# Patient Record
Sex: FEMALE | Race: WHITE | NOT HISPANIC OR LATINO | Employment: FULL TIME | URBAN - METROPOLITAN AREA
[De-identification: names, ages, dates, MRNs, and addresses within clinical notes are randomized per-mention and may not be internally consistent; named-entity substitution may affect disease eponyms.]

---

## 2017-08-19 ENCOUNTER — HOSPITAL ENCOUNTER (EMERGENCY)
Facility: HOSPITAL | Age: 27
Discharge: HOME | End: 2017-08-19
Attending: EMERGENCY MEDICINE
Payer: COMMERCIAL

## 2017-08-19 VITALS
RESPIRATION RATE: 20 BRPM | HEART RATE: 87 BPM | SYSTOLIC BLOOD PRESSURE: 113 MMHG | WEIGHT: 145 LBS | OXYGEN SATURATION: 99 % | TEMPERATURE: 98 F | DIASTOLIC BLOOD PRESSURE: 63 MMHG

## 2017-08-19 DIAGNOSIS — R51.9 HEADACHE: ICD-10-CM

## 2017-08-19 DIAGNOSIS — S09.90XA CLOSED HEAD INJURY, INITIAL ENCOUNTER: ICD-10-CM
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
## 2017-08-19 DIAGNOSIS — S16.1XXA NECK MUSCLE STRAIN, INITIAL ENCOUNTER: Primary | ICD-10-CM

## 2017-08-19 PROCEDURE — 99283 EMERGENCY DEPT VISIT LOW MDM: CPT

## 2017-08-19 RX ORDER — IBUPROFEN 600 MG/1
600 TABLET ORAL ONCE
Status: COMPLETED | OUTPATIENT
Start: 2017-08-19 | End: 2017-08-19

## 2017-08-19 RX ORDER — ACETAMINOPHEN 325 MG/1
650 TABLET ORAL ONCE
Status: COMPLETED | OUTPATIENT
Start: 2017-08-19 | End: 2017-08-19

## 2017-08-19 RX ADMIN — ACETAMINOPHEN 650 MG: 325 TABLET, FILM COATED ORAL at 07:03

## 2017-08-19 RX ADMIN — IBUPROFEN 600 MG: 600 TABLET, FILM COATED ORAL at 07:03

## 2017-11-29 ENCOUNTER — APPOINTMENT (OUTPATIENT)
Dept: URGENT CARE | Facility: CLINIC | Age: 27
End: 2017-11-29
Payer: OTHER MISCELLANEOUS

## 2017-11-29 PROCEDURE — 99283 EMERGENCY DEPT VISIT LOW MDM: CPT

## 2017-11-29 PROCEDURE — G0382 LEV 3 HOSP TYPE B ED VISIT: HCPCS

## 2017-12-05 ENCOUNTER — APPOINTMENT (OUTPATIENT)
Dept: URGENT CARE | Facility: CLINIC | Age: 27
End: 2017-12-05
Payer: OTHER MISCELLANEOUS

## 2017-12-05 PROCEDURE — 99213 OFFICE O/P EST LOW 20 MIN: CPT

## 2018-12-30 ENCOUNTER — OFFICE VISIT (OUTPATIENT)
Dept: URGENT CARE | Facility: CLINIC | Age: 28
End: 2018-12-30
Payer: COMMERCIAL

## 2018-12-30 ENCOUNTER — APPOINTMENT (OUTPATIENT)
Dept: RADIOLOGY | Facility: CLINIC | Age: 28
End: 2018-12-30
Payer: COMMERCIAL

## 2018-12-30 VITALS
BODY MASS INDEX: 22.07 KG/M2 | HEART RATE: 68 BPM | TEMPERATURE: 98.5 F | WEIGHT: 149 LBS | SYSTOLIC BLOOD PRESSURE: 112 MMHG | OXYGEN SATURATION: 97 % | HEIGHT: 69 IN | RESPIRATION RATE: 16 BRPM | DIASTOLIC BLOOD PRESSURE: 74 MMHG

## 2018-12-30 DIAGNOSIS — S62.336A CLOSED DISPLACED FRACTURE OF NECK OF FIFTH METACARPAL BONE OF RIGHT HAND, INITIAL ENCOUNTER: Primary | ICD-10-CM

## 2018-12-30 DIAGNOSIS — M79.641 HAND PAIN, RIGHT: ICD-10-CM

## 2018-12-30 PROCEDURE — 73130 X-RAY EXAM OF HAND: CPT

## 2018-12-30 PROCEDURE — 29125 APPL SHORT ARM SPLINT STATIC: CPT | Performed by: PHYSICIAN ASSISTANT

## 2018-12-30 PROCEDURE — G0382 LEV 3 HOSP TYPE B ED VISIT: HCPCS | Performed by: PHYSICIAN ASSISTANT

## 2018-12-30 NOTE — PATIENT INSTRUCTIONS
Ibuprofen for pain and swelling  Ice to the area  Elevate  Were splint until seen by ortho  Call Ortho tomorrow morning at opening for an appointment  If anything worsens or changes go to the ER

## 2018-12-30 NOTE — PROGRESS NOTES
NAME: Curtis Guerrero is a 33 y o  female  : 1990    MRN: [id number]      Assessment and Plan   Closed displaced fracture of neck of fifth metacarpal bone of right hand, initial encounter [C07 336A]  1  Closed displaced fracture of neck of fifth metacarpal bone of right hand, initial encounter  Ambulatory referral to Orthopedic Surgery   2  Hand pain, right  XR hand 3+ vw right     Ulnar gutter ortho glass splint applied in clinic  Patient neurovascularly intact postprocedure  Right hand x-ray:  Fracture of the distal 5th metacarpal   Spoke with Radiology on the phone-they report a 45 degree the volar angulation  Spoke with CUATE Gonzalez at [facility] ER- she instruct to splint patient in with some traction but referred to Ortho tomorrow  She reports no need for immediate reduction  Patient Instructions   Patient Instructions   Ibuprofen for pain and swelling  Ice to the area  Elevate  Were splint until seen by ortho  Call Ortho tomorrow morning at opening for an appointment  If anything worsens or changes go to the ER    Proceed to ER if symptoms worsen  History of Present Illness     Patient presents complaining of right hand pain x1 day  She reports she woke up this morning and noticed that her right hand was swollen and painful  She reports she was drinking last night and does not remember what happened but states her hand is not like that prior  She reports mild pain at rest but more severe pain with movement especially of the MCP joints  Denies any numbness or tingling to the finger tips  She reports fiber 6 years ago she had a 5th metacarpal fracture from punching a wall  She reports that was pinned she has had no problems since  Review of Systems   Review of Systems   Musculoskeletal:        Right hand pain and swelling         Current Medications     No current outpatient prescriptions on file      Current Allergies     Allergies as of 2018 - Reviewed 2018 Allergen Reaction Noted    Penicillins  08/19/2017              No past medical history on file  No past surgical history on file  No family history on file  Medications have been verified  The following portions of the patient's history were reviewed and updated as appropriate: allergies, current medications, past family history, past medical history, past social history, past surgical history and problem list     Objective   /74   Pulse 68   Temp 98 5 °F (36 9 °C)   Resp 16   Ht 5' 9" (1 753 m)   Wt 67 6 kg (149 lb)   SpO2 97%   BMI 22 00 kg/m²      Physical Exam     Physical Exam   Constitutional: She appears well-developed and well-nourished  No distress  Musculoskeletal:   Right hand:  Area of edema and ecchymosis overlying the dorsal aspect of the 4th and 5th MCP joints  Tender to palpation over this area  Flexion of MCP joints to 90° but with pain  Full range of motion of distal fingers without much pain  Capillary refill less than 2 seconds distal   Sensation intact distally

## 2018-12-30 NOTE — LETTER
December 30, 2018     Patient: Bruce Aldana   YOB: 1990   Date of Visit: 12/30/2018       To Whom it May Concern:    Bruce Aldana was seen in my clinic on 12/30/2018  If you have any questions or concerns, please don't hesitate to call           Sincerely,          KAREN Beaulieu        CC: No Recipients

## 2019-01-02 ENCOUNTER — OFFICE VISIT (OUTPATIENT)
Dept: OBGYN CLINIC | Facility: CLINIC | Age: 29
End: 2019-01-02
Payer: COMMERCIAL

## 2019-01-02 VITALS
DIASTOLIC BLOOD PRESSURE: 68 MMHG | WEIGHT: 148 LBS | BODY MASS INDEX: 21.92 KG/M2 | HEIGHT: 69 IN | HEART RATE: 72 BPM | SYSTOLIC BLOOD PRESSURE: 110 MMHG

## 2019-01-02 DIAGNOSIS — S62.366A NONDISPLACED FRACTURE OF NECK OF FIFTH METACARPAL BONE, RIGHT HAND, INITIAL ENCOUNTER FOR CLOSED FRACTURE: ICD-10-CM

## 2019-01-02 PROCEDURE — 29125 APPL SHORT ARM SPLINT STATIC: CPT | Performed by: PHYSICIAN ASSISTANT

## 2019-01-02 PROCEDURE — 99203 OFFICE O/P NEW LOW 30 MIN: CPT | Performed by: PHYSICIAN ASSISTANT

## 2019-01-02 NOTE — LETTER
January 2, 2019     Patient: Mic Lauren   YOB: 1990   Date of Visit: 1/2/2019       To Whom it May Concern:    Mic Lauren is under my professional care  She was seen in my office on 1/2/2019  She  may do sedentary duty at work  No lifting greater than 5 lb  Must wear splint to right hand at work  She will be re-evaluated in 4-6 weeks  If you have any questions or concerns, please don't hesitate to call           Sincerely,          Yong Ramires MD        CC: No Recipients

## 2019-01-02 NOTE — PROGRESS NOTES
Assessment:     1  Nondisplaced fracture of neck of fifth metacarpal bone, right hand, initial encounter for closed fracture        Plan:  The patient was seen and examined by Dr Ac Gallo and myself  Problem List Items Addressed This Visit        Musculoskeletal and Integument    Nondisplaced fracture of neck of fifth metacarpal bone, right hand, initial encounter for closed fracture     Findings consistent with right nondisplaced 5th metacarpal neck fracture  Findings and treatment options were discussed the patient  X-rays reviewed with her  Recommend custom molded ulnar gutter splint to be made by the occupational therapist  She may remove it for bathing and occasionally for gentle range of motion  The occupational therapist was not available today since she was placed in a new ulnar gutter splint that she will wear until she is able to be seen by the occupational therapist   Continue nonweightbearing to the right upper extremity  She was given a work note  Follow-up in 4-6 weeks with repeat x-rays  All questions were answered to patient's satisfaction  Relevant Orders    Ambulatory referral to Occupational Therapy    Orthopedic injury treatment (Completed)         Subjective:     Patient ID: Alena Tesfaye is a 33 y o  female  Chief Complaint: This is a right-hand-dominant 65-year-old white female who suffered injury to her right hand on December 29, 2018  Patient does not remember how she injured her hand since she was intoxicated  She remembers waking up with increased swelling and pain in that hand  She was seen at urgent care and x-rays revealed a fracture at the neck of the 5th metacarpal   She was placed in ulnar gutter splint  Her pain is well controlled  She states she had a prior fracture to her 5th metacarpal about 5-6 years ago that was treated with a percutaneous pin with no complications  The pin was later removed  Patient intake form was reviewed today  Allergy:  Allergies   Allergen Reactions    Penicillins      Medications:  all current active meds have been reviewed  Past Medical History:  History reviewed  No pertinent past medical history  Past Surgical History:  History reviewed  No pertinent surgical history  Family History:  Family History   Problem Relation Age of Onset    Diabetes Father      Social History:  History   Alcohol Use    Yes     History   Drug Use No     History   Smoking Status    Never Smoker   Smokeless Tobacco    Never Used     Review of Systems   Constitutional: Negative  HENT: Negative  Eyes: Negative  Respiratory: Negative  Cardiovascular: Negative  Gastrointestinal: Negative  Endocrine: Negative  Genitourinary: Negative  Musculoskeletal: Positive for arthralgias and joint swelling  Skin: Negative  Allergic/Immunologic: Negative  Neurological: Negative  Hematological: Negative  Psychiatric/Behavioral: Negative  Objective:  BP Readings from Last 1 Encounters:   01/02/19 110/68      Wt Readings from Last 1 Encounters:   01/02/19 67 1 kg (148 lb)      BMI:   Estimated body mass index is 21 86 kg/m² as calculated from the following:    Height as of this encounter: 5' 9" (1 753 m)  Weight as of this encounter: 67 1 kg (148 lb)  BSA:   Estimated body surface area is 1 82 meters squared as calculated from the following:    Height as of this encounter: 5' 9" (1 753 m)  Weight as of this encounter: 67 1 kg (148 lb)  Physical Exam   Constitutional: She is oriented to person, place, and time  She appears well-developed  HENT:   Head: Normocephalic and atraumatic  Eyes: Conjunctivae and EOM are normal    Neck: Neck supple  Neurological: She is alert and oriented to person, place, and time  Skin: Skin is warm  Psychiatric: She has a normal mood and affect  Nursing note and vitals reviewed      Right Hand Exam     Tenderness   Right hand tenderness location: Fifth metacarpal neck  Other   Erythema: absent  Scars: present (Over distal aspect of 5th metacarpal)  Sensation: normal  Pulse: present    Comments:  Swelling and ecchymosis over 5th metacarpal  No rotational deformity of small finger  Capillary refill brisk            I have personally reviewed pertinent films in PACS    X-rays of the right hand reveal a nondisplaced fracture at the neck of the 5th metacarpal     Fracture / Dislocation Treatment  Date/Time: 1/2/2019 5:34 PM  Performed by: Minnie Moss  Authorized by: Yosef Gonzales     Patient Location:  Clinic  Injury location:  Hand  Location details:  Right hand  Injury type:  Fracture  Neurovascular status: Neurovascularly intact    Distal perfusion: normal    Neurological function: normal    Range of motion: reduced    Manipulation performed?: No    Immobilization:  Splint  Splint type:  Ulnar gutter  Supplies used:  Cotton padding and plaster  Neurovascular status: Neurovascularly intact    Distal perfusion: normal    Neurological function: normal    Range of motion: unchanged    Patient tolerance:  Patient tolerated the procedure well with no immediate complications

## 2019-01-02 NOTE — ASSESSMENT & PLAN NOTE
Findings consistent with right nondisplaced 5th metacarpal neck fracture  Findings and treatment options were discussed the patient  X-rays reviewed with her  Recommend custom molded ulnar gutter splint to be made by the occupational therapist  She may remove it for bathing and occasionally for gentle range of motion  The occupational therapist was not available today since she was placed in a new ulnar gutter splint that she will wear until she is able to be seen by the occupational therapist   Continue nonweightbearing to the right upper extremity  She was given a work note  Follow-up in 4-6 weeks with repeat x-rays  All questions were answered to patient's satisfaction

## 2019-01-03 ENCOUNTER — EVALUATION (OUTPATIENT)
Dept: OCCUPATIONAL THERAPY | Facility: CLINIC | Age: 29
End: 2019-01-03
Payer: COMMERCIAL

## 2019-01-03 DIAGNOSIS — S62.245A: Primary | ICD-10-CM

## 2019-01-03 PROCEDURE — 97760 ORTHOTIC MGMT&TRAING 1ST ENC: CPT | Performed by: OCCUPATIONAL THERAPIST

## 2019-01-03 NOTE — PROGRESS NOTES
Splint     Diagnosis:   1  Nondisplaced fracture of shaft of first metacarpal bone, left hand, initial encounter for closed fracture       Indication: Fracture    Location: Right  ring finger and small finger  Wrist   Supplies: Custom Fit Orthotic and Skin coverage   Splint type: Ulnar Gutter Hand-Forearm based  Wearing Schedule: Remove for hygiene only  Describe Position: intrinsic plus    Precautions: Universal (skin contact/breakdown)    Patient or Caregiver expresses understanding of wearing Schedule and Precautions? Yes  Patient or Caregiver able to don/doff orthotic independently? Yes    Written orders provided to patient?  Yes  Orders Obtained: Written  Orders Obtained from: dr Banda Fearing      Return for evaluation and treatment No

## 2019-02-06 ENCOUNTER — OFFICE VISIT (OUTPATIENT)
Dept: OBGYN CLINIC | Facility: CLINIC | Age: 29
End: 2019-02-06
Payer: COMMERCIAL

## 2019-02-06 ENCOUNTER — APPOINTMENT (OUTPATIENT)
Dept: RADIOLOGY | Facility: CLINIC | Age: 29
End: 2019-02-06
Payer: COMMERCIAL

## 2019-02-06 VITALS
WEIGHT: 142 LBS | HEIGHT: 68 IN | DIASTOLIC BLOOD PRESSURE: 68 MMHG | SYSTOLIC BLOOD PRESSURE: 110 MMHG | BODY MASS INDEX: 21.52 KG/M2 | HEART RATE: 72 BPM

## 2019-02-06 DIAGNOSIS — S62.366A NONDISPLACED FRACTURE OF NECK OF FIFTH METACARPAL BONE, RIGHT HAND, INITIAL ENCOUNTER FOR CLOSED FRACTURE: ICD-10-CM

## 2019-02-06 DIAGNOSIS — S62.366D CLOSED NONDISPLACED FRACTURE OF NECK OF FIFTH METACARPAL BONE OF RIGHT HAND WITH ROUTINE HEALING, SUBSEQUENT ENCOUNTER: Primary | ICD-10-CM

## 2019-02-06 PROCEDURE — 99213 OFFICE O/P EST LOW 20 MIN: CPT | Performed by: ORTHOPAEDIC SURGERY

## 2019-02-06 PROCEDURE — 73130 X-RAY EXAM OF HAND: CPT

## 2019-02-06 NOTE — LETTER
February 6, 2019     Patient: Terri Rock   YOB: 1990   Date of Visit: 2/6/2019       To Whom it May Concern:    Terri Rock is under my professional care  She was seen in my office on 2/6/2019  She may return to work on 2/15/19  If you have any questions or concerns, please don't hesitate to call           Sincerely,          Maddison Sanabria MD        CC: No Recipients

## 2019-02-06 NOTE — PROGRESS NOTES
Assessment:     1  Closed nondisplaced fracture of neck of fifth metacarpal bone of right hand with routine healing, subsequent encounter        Plan:     Problem List Items Addressed This Visit        Musculoskeletal and Integument    Closed nondisplaced fracture of neck of fifth metacarpal bone of right hand - Primary     Findings consistent with right 5th metacarpal neck nondisplaced fracture, healing  Discussed findings and treatment options with the patient  Patient may wean of the ulnar gutter splint  I instructed home exercises to regain motion and strength  I provided patient a work note for her to return to work on 2/15/2019  Patient understands that she may still has some weakness with her right hand by that time  I advised patient to contact me if her symptoms does not resolve in a few weeks  All patient's questions were answered to her satisfaction  This note is created using dictation transcription  It may contain typographical errors, grammatical errors, improperly dictated words, background noise and other errors  Relevant Orders    XR hand 3+ vw right         Subjective:     Patient ID: Florencia Rodarte is a 33 y o  female  Chief Complaint:  67-year-old female follow-up right 5th metacarpal neck fracture  Patient has been wearing the ulnar gutter splint made by occupational therapist   She does complaining of some discomfort around the wrist by the 5th metacarpal   She has some limitation with little finger bending and mild pain over the little finger  Allergy:  Allergies   Allergen Reactions    Penicillins      Medications:  all current active meds have been reviewed  Past Medical History:  History reviewed  No pertinent past medical history  Past Surgical History:  History reviewed  No pertinent surgical history    Family History:  Family History   Problem Relation Age of Onset    Diabetes Father      Social History:  History   Alcohol Use    Yes     History   Drug Use No     History   Smoking Status    Never Smoker   Smokeless Tobacco    Never Used     Review of Systems   Constitutional: Negative  HENT: Negative  Eyes: Negative  Respiratory: Negative  Cardiovascular: Negative  Gastrointestinal: Negative  Endocrine: Negative  Genitourinary: Negative  Musculoskeletal: Negative for arthralgias and joint swelling  Skin: Negative  Allergic/Immunologic: Negative  Neurological: Negative  Hematological: Negative  Psychiatric/Behavioral: Negative  Objective:  BP Readings from Last 1 Encounters:   02/06/19 110/68      Wt Readings from Last 1 Encounters:   02/06/19 64 4 kg (142 lb)      BMI:   Estimated body mass index is 21 59 kg/m² as calculated from the following:    Height as of this encounter: 5' 8" (1 727 m)  Weight as of this encounter: 64 4 kg (142 lb)  BSA:   Estimated body surface area is 1 77 meters squared as calculated from the following:    Height as of this encounter: 5' 8" (1 727 m)  Weight as of this encounter: 64 4 kg (142 lb)  Physical Exam   Constitutional: She is oriented to person, place, and time  She appears well-developed  HENT:   Head: Normocephalic and atraumatic  Eyes: Conjunctivae and EOM are normal    Neck: Neck supple  Neurological: She is alert and oriented to person, place, and time  Skin: Skin is warm  Psychiatric: She has a normal mood and affect  Nursing note and vitals reviewed  Right Hand Exam     Tenderness   The patient is experiencing no tenderness  Range of Motion     Hand   MP Little: 70   PIP Little: normal   DIP Little: normal     Muscle Strength   : 4/5     Other   Erythema: absent  Sensation: normal  Pulse: present            I have personally reviewed pertinent films in PACS and my interpretation is Right hand show 5th metacarpal neck fracture in good alignment and healing

## 2019-02-06 NOTE — ASSESSMENT & PLAN NOTE
Findings consistent with right 5th metacarpal neck nondisplaced fracture, healing  Discussed findings and treatment options with the patient  Patient may wean of the ulnar gutter splint  I instructed home exercises to regain motion and strength  I provided patient a work note for her to return to work on 2/15/2019  Patient understands that she may still has some weakness with her right hand by that time  I advised patient to contact me if her symptoms does not resolve in a few weeks  All patient's questions were answered to her satisfaction  This note is created using dictation transcription  It may contain typographical errors, grammatical errors, improperly dictated words, background noise and other errors

## 2019-06-14 ENCOUNTER — APPOINTMENT (OUTPATIENT)
Dept: RADIOLOGY | Facility: CLINIC | Age: 29
End: 2019-06-14
Payer: COMMERCIAL

## 2019-06-14 ENCOUNTER — TRANSCRIBE ORDERS (OUTPATIENT)
Dept: RADIOLOGY | Facility: CLINIC | Age: 29
End: 2019-06-14

## 2019-06-14 DIAGNOSIS — R52 PAIN: ICD-10-CM

## 2019-06-14 DIAGNOSIS — R52 PAIN: Primary | ICD-10-CM

## 2019-06-14 PROCEDURE — 73030 X-RAY EXAM OF SHOULDER: CPT

## 2019-06-14 PROCEDURE — 72040 X-RAY EXAM NECK SPINE 2-3 VW: CPT

## 2019-07-07 ENCOUNTER — APPOINTMENT (OUTPATIENT)
Dept: RADIOLOGY | Facility: CLINIC | Age: 29
End: 2019-07-07
Payer: COMMERCIAL

## 2019-07-07 ENCOUNTER — OFFICE VISIT (OUTPATIENT)
Dept: URGENT CARE | Facility: CLINIC | Age: 29
End: 2019-07-07
Payer: COMMERCIAL

## 2019-07-07 VITALS
DIASTOLIC BLOOD PRESSURE: 58 MMHG | HEIGHT: 68 IN | TEMPERATURE: 97.1 F | RESPIRATION RATE: 16 BRPM | SYSTOLIC BLOOD PRESSURE: 94 MMHG | HEART RATE: 77 BPM | BODY MASS INDEX: 19.4 KG/M2 | WEIGHT: 128 LBS | OXYGEN SATURATION: 98 %

## 2019-07-07 DIAGNOSIS — M79.641 HAND PAIN, RIGHT: Primary | ICD-10-CM

## 2019-07-07 DIAGNOSIS — M79.641 HAND PAIN, RIGHT: ICD-10-CM

## 2019-07-07 PROCEDURE — 73130 X-RAY EXAM OF HAND: CPT

## 2019-07-07 PROCEDURE — G0382 LEV 3 HOSP TYPE B ED VISIT: HCPCS | Performed by: PHYSICIAN ASSISTANT

## 2019-07-07 RX ORDER — CLONAZEPAM 0.5 MG/1
TABLET ORAL
COMMUNITY
Start: 2019-07-01

## 2019-07-07 NOTE — PATIENT INSTRUCTIONS
Recommend OTC ibuprofen as directed  Follow up with Orthopedics  Continue with ice & rest  Go to ER if numbness, tingling, or difficulty breathing develop

## 2019-07-07 NOTE — LETTER
July 7, 2019     Patient: Emma Abreu   YOB: 1990   Date of Visit: 7/7/2019       To Whom it May Concern:    Emma Abreu was seen in my clinic on 7/7/2019  She may return to work on 7/9/2019        Sincerely,          Angélica Feliciano PA-C        CC: No Recipients

## 2019-07-07 NOTE — PROGRESS NOTES
Assessment/Plan    Hand pain, right [M79 641]  1  Hand pain, right  XR hand 3+ vw right     Displaced fracture of fifth metacarpal  Splint applied  Recommend OTC ibuprofen as directed  Follow up with Orthopedics scheduled  Continue with ice & rest  Go to ER if numbness, tingling, or difficulty breathing develop    Subjective:     Patient ID: Milderd Car is a 33 y o  female  Reason For Visit / Chief Complaint  Chief Complaint   Patient presents with    Hand Injury     right hand hit a wall  Hx of FX  Patient presents with complaint of right hand pain that began last night  Pt states that she punched a wall and has had difficulty moving her hand/fingers since  Pt states that she broke her hand in approximately the same place twice before, once requiring a pin  Pt states that the last time was about 2 months ago and she had orthopedics remove the pin 2 weeks early so that she could go back to work  Pt states that the pain is an 8/10 and throbbing  Pt denies radiation of pain, numbness, tingling, fever, chills, chest pain, and SOB  History reviewed  No pertinent past medical history  History reviewed  No pertinent surgical history  Family History   Problem Relation Age of Onset    Diabetes Father        Review of Systems   Constitutional: Negative for chills, fatigue and fever  Respiratory: Negative for cough, chest tightness, shortness of breath and wheezing  Cardiovascular: Negative for chest pain and palpitations  Musculoskeletal: Positive for joint swelling  Negative for myalgias  Skin: Negative for color change, rash and wound  Neurological: Negative for headaches  Objective:    BP 94/58   Pulse 77   Temp (!) 97 1 °F (36 2 °C)   Resp 16   Ht 5' 8" (1 727 m)   Wt 58 1 kg (128 lb)   SpO2 98%   BMI 19 46 kg/m²       Physical Exam   Constitutional: She is oriented to person, place, and time  She appears well-developed and well-nourished  No distress     HENT: Head: Normocephalic and atraumatic  Eyes: Pupils are equal, round, and reactive to light  Conjunctivae and EOM are normal    Neck: Normal range of motion  Neck supple  Cardiovascular: Normal rate, regular rhythm and normal heart sounds  Pulmonary/Chest: Effort normal and breath sounds normal  No respiratory distress  Musculoskeletal: She exhibits edema and tenderness  Right hand: Edematous and ecchymotic over distal ends of fourth and fifth metacarpals; TTP; AROM limited d/t pain and swelling; sensation intact; cap refill <2; radial pulse 2+   Lymphadenopathy:     She has no cervical adenopathy  Neurological: She is alert and oriented to person, place, and time  No cranial nerve deficit or sensory deficit  Skin: Skin is warm and dry  Capillary refill takes less than 2 seconds  No rash noted  She is not diaphoretic  Psychiatric: She has a normal mood and affect  Her behavior is normal  Thought content normal    Nursing note and vitals reviewed  Splint application  Date/Time: 7/7/2019 9:36 AM  Performed by: Hadley Rea PA-C  Authorized by: Hadley Rea PA-C     Consent:     Consent obtained:  Verbal    Consent given by:  Patient    Risks discussed:  Discoloration, numbness, pain and swelling    Alternatives discussed:  No treatment and referral  Pre-procedure details:     Sensation:  Normal  Procedure details:     Laterality:  Right    Location:  Hand    Hand:  R hand    Splint type:  Wrist (dynamic)  Post-procedure details:     Pain:  Unchanged    Sensation:  Normal    Patient tolerance of procedure: Tolerated well, no immediate complications  Comments:      Pt tolerated dynamic splint placement well  Sensation intact, cap refill <2  Orthopedic appointment scheduled for Tuesday 7/9/2019

## 2019-07-09 ENCOUNTER — OFFICE VISIT (OUTPATIENT)
Dept: OBGYN CLINIC | Facility: CLINIC | Age: 29
End: 2019-07-09
Payer: COMMERCIAL

## 2019-07-09 VITALS
BODY MASS INDEX: 20 KG/M2 | HEIGHT: 68 IN | DIASTOLIC BLOOD PRESSURE: 70 MMHG | WEIGHT: 132 LBS | SYSTOLIC BLOOD PRESSURE: 120 MMHG

## 2019-07-09 DIAGNOSIS — M79.641 RIGHT HAND PAIN: ICD-10-CM

## 2019-07-09 DIAGNOSIS — S62.336A DISPLACED FRACTURE OF NECK OF FIFTH METACARPAL BONE, RIGHT HAND, INITIAL ENCOUNTER FOR CLOSED FRACTURE: Primary | ICD-10-CM

## 2019-07-09 PROCEDURE — 26600 TREAT METACARPAL FRACTURE: CPT | Performed by: FAMILY MEDICINE

## 2019-07-09 PROCEDURE — 99214 OFFICE O/P EST MOD 30 MIN: CPT | Performed by: FAMILY MEDICINE

## 2019-07-09 NOTE — ASSESSMENT & PLAN NOTE
X-ray results have been reviewed and discussed the patient  Recommend ulnar gutter cast at this time  Continue with ice and anti-inflammatories for pain swelling  Patient will return to the office for follow-up in 4 weeks for cast removal and repeat x-rays
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
## 2019-07-30 ENCOUNTER — TELEPHONE (OUTPATIENT)
Dept: OBGYN CLINIC | Facility: HOSPITAL | Age: 29
End: 2019-07-30

## 2019-07-30 NOTE — TELEPHONE ENCOUNTER
Dr Vizcarra      Patient called in stating she just dropped off disability paper work  She will like disability paper work to be faxed when completed   Please advise, thank you       Fax#:[phone] att: Amy Alexandra

## 2019-08-02 ENCOUNTER — OFFICE VISIT (OUTPATIENT)
Dept: OBGYN CLINIC | Facility: CLINIC | Age: 29
End: 2019-08-02

## 2019-08-02 ENCOUNTER — APPOINTMENT (OUTPATIENT)
Dept: RADIOLOGY | Facility: CLINIC | Age: 29
End: 2019-08-02
Payer: COMMERCIAL

## 2019-08-02 VITALS
HEIGHT: 68 IN | BODY MASS INDEX: 20.46 KG/M2 | SYSTOLIC BLOOD PRESSURE: 110 MMHG | HEART RATE: 72 BPM | WEIGHT: 135 LBS | DIASTOLIC BLOOD PRESSURE: 70 MMHG

## 2019-08-02 DIAGNOSIS — S62.336A DISPLACED FRACTURE OF NECK OF FIFTH METACARPAL BONE, RIGHT HAND, INITIAL ENCOUNTER FOR CLOSED FRACTURE: ICD-10-CM

## 2019-08-02 DIAGNOSIS — S62.336A DISPLACED FRACTURE OF NECK OF FIFTH METACARPAL BONE, RIGHT HAND, INITIAL ENCOUNTER FOR CLOSED FRACTURE: Primary | ICD-10-CM

## 2019-08-02 PROCEDURE — 99024 POSTOP FOLLOW-UP VISIT: CPT | Performed by: FAMILY MEDICINE

## 2019-08-02 PROCEDURE — 73130 X-RAY EXAM OF HAND: CPT

## 2019-08-02 NOTE — ASSESSMENT & PLAN NOTE
X-ray today demonstrates initial onset of callus formation with incomplete healing  Patient reports having no pain in the area of her fracture at this time  The patient will return to the office on August 8, 2019 for a nursing visit for cast removal   At that time, she will be transition into a custom ulnar gutter splint and a script for this splint has been provided today  I will see the patient back on August 13, 2019 for repeat x-rays of her hand

## 2019-08-02 NOTE — PROGRESS NOTES
Assessment:     1  Displaced fracture of neck of fifth metacarpal bone, right hand, initial encounter for closed fracture        Plan:     Problem List Items Addressed This Visit        Musculoskeletal and Integument    Displaced fracture of neck of fifth metacarpal bone, right hand, initial encounter for closed fracture - Primary     X-ray today demonstrates initial onset of callus formation with incomplete healing  Patient reports having no pain in the area of her fracture at this time  The patient will return to the office on August 8, 2019 for a nursing visit for cast removal   At that time, she will be transition into a custom ulnar gutter splint and a script for this splint has been provided today  I will see the patient back on August 13, 2019 for repeat x-rays of her hand  Relevant Orders    XR hand 3+ vw right    Durable Medical Equipment         Subjective:     Patient ID: Skyler Arechiga is a 33 y o  female  Chief Complaint:  Patient reports no pain in the hand at this time  She has tolerated her cast well with no difficulties  She denies any numbness or tingling  She denies any crepitus, warmth or swelling  Allergy:  Allergies   Allergen Reactions    Amoxicillin     Penicillins      Medications:  all current active meds have been reviewed  Past Medical History:  History reviewed  No pertinent past medical history  Past Surgical History:  History reviewed  No pertinent surgical history  Family History:  Family History   Problem Relation Age of Onset    Diabetes Father      Social History:  Social History     Substance and Sexual Activity   Alcohol Use Yes     Social History     Substance and Sexual Activity   Drug Use No     Social History     Tobacco Use   Smoking Status Current Every Day Smoker    Types: Cigarettes   Smokeless Tobacco Never Used     Review of Systems   Constitutional: Negative  HENT: Negative  Eyes: Negative  Respiratory: Negative      Cardiovascular: Negative  Gastrointestinal: Negative  Genitourinary: Negative  Musculoskeletal: Positive for arthralgias and myalgias  Skin: Negative  Allergic/Immunologic: Negative  Neurological: Negative  Hematological: Negative  Psychiatric/Behavioral: Negative  Objective:  BP Readings from Last 1 Encounters:   08/02/19 110/70      Wt Readings from Last 1 Encounters:   08/02/19 61 2 kg (135 lb)      BMI:   Estimated body mass index is 20 53 kg/m² as calculated from the following:    Height as of this encounter: 5' 8" (1 727 m)  Weight as of this encounter: 61 2 kg (135 lb)  BSA:   Estimated body surface area is 1 73 meters squared as calculated from the following:    Height as of this encounter: 5' 8" (1 727 m)  Weight as of this encounter: 61 2 kg (135 lb)  Physical Exam   Constitutional: She is oriented to person, place, and time  Vital signs are normal  She appears well-developed  HENT:   Head: Normocephalic  Eyes: Pupils are equal, round, and reactive to light  Pulmonary/Chest: Effort normal    Musculoskeletal: Normal range of motion  Neurological: She is alert and oriented to person, place, and time  Skin: Skin is warm and dry  Psychiatric: She has a normal mood and affect  Nursing note and vitals reviewed  Right Hand Exam     Tenderness   The patient is experiencing tenderness in the dorsal area and ulnar area  Range of Motion   Hand   MP Ring: abnormal   MP Little: abnormal   PIP Ring: abnormal   PIP Little: abnormal   DIP Ring: abnormal   DIP Little: abnormal     Muscle Strength   The patient has normal right wrist strength  Other   Erythema: absent  Sensation: normal  Pulse: present    Comments:  Palpable tenderness along the 5th metacarpal with noted edema and ecchymosis  I have personally reviewed pertinent films in PACS  Initial onset of callus formation with incomplete healing

## 2019-08-08 ENCOUNTER — OFFICE VISIT (OUTPATIENT)
Dept: OBGYN CLINIC | Facility: CLINIC | Age: 29
End: 2019-08-08
Payer: COMMERCIAL

## 2019-08-08 ENCOUNTER — OFFICE VISIT (OUTPATIENT)
Dept: OCCUPATIONAL THERAPY | Facility: CLINIC | Age: 29
End: 2019-08-08
Payer: COMMERCIAL

## 2019-08-08 VITALS
DIASTOLIC BLOOD PRESSURE: 70 MMHG | WEIGHT: 135 LBS | BODY MASS INDEX: 20.46 KG/M2 | HEIGHT: 68 IN | SYSTOLIC BLOOD PRESSURE: 123 MMHG

## 2019-08-08 DIAGNOSIS — S62.336A DISPLACED FRACTURE OF NECK OF FIFTH METACARPAL BONE, RIGHT HAND, INITIAL ENCOUNTER FOR CLOSED FRACTURE: Primary | ICD-10-CM

## 2019-08-08 DIAGNOSIS — S62.366D CLOSED NONDISPLACED FRACTURE OF NECK OF FIFTH METACARPAL BONE OF RIGHT HAND WITH ROUTINE HEALING, SUBSEQUENT ENCOUNTER: Primary | ICD-10-CM

## 2019-08-08 PROCEDURE — 99212 OFFICE O/P EST SF 10 MIN: CPT | Performed by: ORTHOPAEDIC SURGERY

## 2019-08-08 PROCEDURE — L3808 WHFO, RIGID W/O JOINTS: HCPCS | Performed by: OCCUPATIONAL THERAPIST

## 2019-08-08 NOTE — PROGRESS NOTES
Assessment:     1  Displaced fracture of neck of fifth metacarpal bone, right hand, initial encounter for closed fracture        Plan:     Problem List Items Addressed This Visit        Musculoskeletal and Integument    Displaced fracture of neck of fifth metacarpal bone, right hand, initial encounter for closed fracture - Primary     Findings consistent with right 5th metacarpal neck fracture volar angulation  Boxer's cast was removed today  She is to have the occupational therapist make her a custom molded ulnar gutter splint to use with activities for protection  She is to continue to avoid heavy lifting, pushing or pulling with the right hand  She is to follow-up as scheduled with Dr Hollis Goodpasture next week with repeat x-rays  All questions were answered to patient's satisfaction  Subjective:     Patient ID: Veto Nino is a 33 y o  female  Chief Complaint: This is a 39-year-old white female following up for a right 5th metacarpal neck fracture with angulation  She tolerated the boxer's cast well  She denies any pain at this time  Allergy:  Allergies   Allergen Reactions    Amoxicillin     Penicillins      Medications:  all current active meds have been reviewed  Past Medical History:  History reviewed  No pertinent past medical history  Past Surgical History:  History reviewed  No pertinent surgical history  Family History:  Family History   Problem Relation Age of Onset    Diabetes Father      Social History:  Social History     Substance and Sexual Activity   Alcohol Use Yes     Social History     Substance and Sexual Activity   Drug Use No     Social History     Tobacco Use   Smoking Status Current Every Day Smoker    Types: Cigarettes   Smokeless Tobacco Never Used     Review of Systems   Constitutional: Negative  HENT: Negative  Eyes: Negative  Respiratory: Negative  Cardiovascular: Negative  Gastrointestinal: Negative  Genitourinary: Negative  Musculoskeletal: Negative for arthralgias  Skin: Negative  Allergic/Immunologic: Negative  Neurological: Negative  Hematological: Negative  Psychiatric/Behavioral: Negative  Objective:  BP Readings from Last 1 Encounters:   08/08/19 123/70      Wt Readings from Last 1 Encounters:   08/08/19 61 2 kg (135 lb)      BMI:   Estimated body mass index is 20 53 kg/m² as calculated from the following:    Height as of this encounter: 5' 8" (1 727 m)  Weight as of this encounter: 61 2 kg (135 lb)  BSA:   Estimated body surface area is 1 73 meters squared as calculated from the following:    Height as of this encounter: 5' 8" (1 727 m)  Weight as of this encounter: 61 2 kg (135 lb)  Physical Exam   Constitutional: She is oriented to person, place, and time  Vital signs are normal  She appears well-developed  HENT:   Head: Normocephalic  Eyes: Pupils are equal, round, and reactive to light  Pulmonary/Chest: Effort normal    Musculoskeletal: Normal range of motion  Neurological: She is alert and oriented to person, place, and time  Skin: Skin is warm and dry  Psychiatric: She has a normal mood and affect  Nursing note and vitals reviewed  Right Hand Exam     Tenderness   The patient is experiencing no tenderness  Range of Motion   Hand   MP Ring: abnormal   MP Little: abnormal   PIP Ring: abnormal   PIP Little: abnormal   DIP Ring: abnormal   DIP Little: abnormal     Muscle Strength   The patient has normal right wrist strength  Other   Erythema: absent  Sensation: normal  Pulse: present    Comments:  Nontender over fracture site  Improved swelling  Unable to make a composite fist due to stiffness  No rotational deformity            No imaging today

## 2019-08-08 NOTE — ASSESSMENT & PLAN NOTE
Findings consistent with right 5th metacarpal neck fracture volar angulation  Boxer's cast was removed today  She is to have the occupational therapist make her a custom molded ulnar gutter splint to use with activities for protection  She is to continue to avoid heavy lifting, pushing or pulling with the right hand  She is to follow-up as scheduled with Dr Francine Gregorio next week with repeat x-rays  All questions were answered to patient's satisfaction

## 2019-08-08 NOTE — PROGRESS NOTES
Orthosis    Diagnosis:   1  Closed nondisplaced fracture of neck of fifth metacarpal bone of right hand with routine healing, subsequent encounter       Indication: Fracture    Location: Right  wrist, ring finger and small finger  Supplies: Custom Fit Orthotic and Skin coverage   Orthosis type: Ulnar Gutter Hand-Forearm based  Wearing Schedule: Remove for hygiene only  Describe Position: intrinsic plus     Precautions: Fracture    Patient or Caregiver expresses understanding of wearing Schedule and Precautions? Yes  Patient or Caregiver able to don/doff orthotic independently? Yes    Written orders provided to patient?  Yes  Orders Obtained: Written  Orders Obtained from: Dr Frandy Frances  Return for evaluation and treatment No

## 2019-08-13 ENCOUNTER — OFFICE VISIT (OUTPATIENT)
Dept: OBGYN CLINIC | Facility: CLINIC | Age: 29
End: 2019-08-13

## 2019-08-13 ENCOUNTER — APPOINTMENT (OUTPATIENT)
Dept: RADIOLOGY | Facility: CLINIC | Age: 29
End: 2019-08-13
Payer: COMMERCIAL

## 2019-08-13 VITALS
WEIGHT: 135 LBS | HEART RATE: 72 BPM | HEIGHT: 68 IN | DIASTOLIC BLOOD PRESSURE: 70 MMHG | SYSTOLIC BLOOD PRESSURE: 112 MMHG | BODY MASS INDEX: 20.46 KG/M2

## 2019-08-13 DIAGNOSIS — S62.336A DISPLACED FRACTURE OF NECK OF FIFTH METACARPAL BONE, RIGHT HAND, INITIAL ENCOUNTER FOR CLOSED FRACTURE: ICD-10-CM

## 2019-08-13 DIAGNOSIS — S62.336A DISPLACED FRACTURE OF NECK OF FIFTH METACARPAL BONE, RIGHT HAND, INITIAL ENCOUNTER FOR CLOSED FRACTURE: Primary | ICD-10-CM

## 2019-08-13 PROCEDURE — 99024 POSTOP FOLLOW-UP VISIT: CPT | Performed by: FAMILY MEDICINE

## 2019-08-13 PROCEDURE — 73130 X-RAY EXAM OF HAND: CPT

## 2019-08-13 NOTE — PROGRESS NOTES
Assessment:     1  Displaced fracture of neck of fifth metacarpal bone, right hand, initial encounter for closed fracture        Plan:     Problem List Items Addressed This Visit        Musculoskeletal and Integument    Displaced fracture of neck of fifth metacarpal bone, right hand, initial encounter for closed fracture - Primary     Patient today with decreased level pain in the hand  X-ray does continue to demonstrate persistent fracture  At this time, will recommend returning to ulnar gutter cast   I will see the patient back in 2 weeks for repeat x-rays with cast off         Relevant Orders    XR hand 3+ vw right         Subjective:     Patient ID: Deborah Funes is a 33 y o  female  Chief Complaint:  Patient with some overall improvement in pain on her hand  She says that she only has pain when she presses on the hand and otherwise no pain at rest   She denies any numbness or tingling  She denies any warmth or crepitus  Allergy:  Allergies   Allergen Reactions    Amoxicillin     Penicillins      Medications:  all current active meds have been reviewed  Past Medical History:  History reviewed  No pertinent past medical history  Past Surgical History:  History reviewed  No pertinent surgical history  Family History:  Family History   Problem Relation Age of Onset    Diabetes Father      Social History:  Social History     Substance and Sexual Activity   Alcohol Use Yes     Social History     Substance and Sexual Activity   Drug Use No     Social History     Tobacco Use   Smoking Status Current Every Day Smoker    Types: Cigarettes   Smokeless Tobacco Never Used     Review of Systems   Constitutional: Negative  HENT: Negative  Eyes: Negative  Respiratory: Negative  Cardiovascular: Negative  Gastrointestinal: Negative  Genitourinary: Negative  Musculoskeletal: Positive for arthralgias and myalgias  Skin: Negative  Allergic/Immunologic: Negative  Neurological: Negative  Hematological: Negative  Psychiatric/Behavioral: Negative  Objective:  BP Readings from Last 1 Encounters:   08/13/19 112/70      Wt Readings from Last 1 Encounters:   08/13/19 61 2 kg (135 lb)      BMI:   Estimated body mass index is 20 53 kg/m² as calculated from the following:    Height as of this encounter: 5' 8" (1 727 m)  Weight as of this encounter: 61 2 kg (135 lb)  BSA:   Estimated body surface area is 1 73 meters squared as calculated from the following:    Height as of this encounter: 5' 8" (1 727 m)  Weight as of this encounter: 61 2 kg (135 lb)  Physical Exam   Constitutional: She is oriented to person, place, and time  Vital signs are normal  She appears well-developed  HENT:   Head: Normocephalic  Eyes: Pupils are equal, round, and reactive to light  Pulmonary/Chest: Effort normal    Musculoskeletal: Normal range of motion  Neurological: She is alert and oriented to person, place, and time  Skin: Skin is warm and dry  Psychiatric: She has a normal mood and affect  Nursing note and vitals reviewed  Right Hand Exam     Tenderness   The patient is experiencing tenderness in the dorsal area and ulnar area  Range of Motion   Hand   MP Ring: abnormal   MP Little: abnormal   PIP Ring: abnormal   PIP Little: abnormal   DIP Ring: abnormal   DIP Little: abnormal     Muscle Strength   The patient has normal right wrist strength  Other   Erythema: absent  Sensation: normal  Pulse: present    Comments:  Palpable tenderness along the 5th metacarpal with noted edema and ecchymosis  I have personally reviewed pertinent films in PACS     Persistent fracture of the 5th metacarpal with no evidence of healing

## 2019-08-13 NOTE — ASSESSMENT & PLAN NOTE
Patient today with decreased level pain in the hand  X-ray does continue to demonstrate persistent fracture    At this time, will recommend returning to ulnar gutter cast   I will see the patient back in 2 weeks for repeat x-rays with cast off

## 2019-08-21 ENCOUNTER — TELEPHONE (OUTPATIENT)
Dept: OBGYN CLINIC | Facility: HOSPITAL | Age: 29
End: 2019-08-21

## 2019-08-21 ENCOUNTER — OFFICE VISIT (OUTPATIENT)
Dept: OBGYN CLINIC | Facility: CLINIC | Age: 29
End: 2019-08-21

## 2019-08-21 VITALS
WEIGHT: 135 LBS | SYSTOLIC BLOOD PRESSURE: 123 MMHG | HEIGHT: 68 IN | BODY MASS INDEX: 20.46 KG/M2 | DIASTOLIC BLOOD PRESSURE: 70 MMHG

## 2019-08-21 DIAGNOSIS — S62.366D CLOSED NONDISPLACED FRACTURE OF NECK OF FIFTH METACARPAL BONE OF RIGHT HAND WITH ROUTINE HEALING, SUBSEQUENT ENCOUNTER: Primary | ICD-10-CM

## 2019-08-21 PROCEDURE — 99024 POSTOP FOLLOW-UP VISIT: CPT | Performed by: FAMILY MEDICINE

## 2019-08-21 NOTE — PROGRESS NOTES
Assessment:   No diagnosis found  Plan:     Problem List Items Addressed This Visit     None         Subjective:     Patient ID: Chelsea Dunlap is a 33 y o  female  Chief Complaint:  Patient presents today for cast check  Patient presents with concerns of new onset of numbness in the middle finger  She states that it wakes her up at night  Patient inquires if there is anything that can be done  Allergy:  Allergies   Allergen Reactions    Amoxicillin     Penicillins      Medications:  all current active meds have been reviewed  Past Medical History:  History reviewed  No pertinent past medical history  Past Surgical History:  History reviewed  No pertinent surgical history  Family History:  Family History   Problem Relation Age of Onset    Diabetes Father      Social History:  Social History     Substance and Sexual Activity   Alcohol Use Yes     Social History     Substance and Sexual Activity   Drug Use No     Social History     Tobacco Use   Smoking Status Current Every Day Smoker    Types: Cigarettes   Smokeless Tobacco Never Used     Review of Systems   Constitutional: Negative  HENT: Negative  Eyes: Negative  Respiratory: Negative  Cardiovascular: Negative  Gastrointestinal: Negative  Genitourinary: Negative  Musculoskeletal: Positive for arthralgias and myalgias  Skin: Negative  Allergic/Immunologic: Negative  Neurological: Negative  Hematological: Negative  Psychiatric/Behavioral: Negative  Objective:  BP Readings from Last 1 Encounters:   08/21/19 123/70      Wt Readings from Last 1 Encounters:   08/21/19 61 2 kg (135 lb)      BMI:   Estimated body mass index is 20 53 kg/m² as calculated from the following:    Height as of this encounter: 5' 8" (1 727 m)  Weight as of this encounter: 61 2 kg (135 lb)  BSA:   Estimated body surface area is 1 73 meters squared as calculated from the following:    Height as of this encounter: 5' 8" (1 727 m)  Weight as of this encounter: 61 2 kg (135 lb)  Physical Exam   Constitutional: She is oriented to person, place, and time  Vital signs are normal  She appears well-developed  HENT:   Head: Normocephalic  Eyes: Pupils are equal, round, and reactive to light  Pulmonary/Chest: Effort normal    Musculoskeletal: Normal range of motion  Neurological: She is alert and oriented to person, place, and time  Skin: Skin is warm and dry  Psychiatric: She has a normal mood and affect  Nursing note and vitals reviewed      Ortho Exam

## 2019-08-21 NOTE — TELEPHONE ENCOUNTER
Patient was in on 08/13/19 when she was casted on her right hand  The cast doesn't feel too tight, but the whole middle finger and thumb on the right hand have gone numb since she had the cast put on  The cast is not on those fingers  She has a f/u appt scheduled for Tues 08/27/19 and was trying to wait it out but she doesn't think this is normal and would like to speak to the nurse      Callback #[phone]

## 2019-08-21 NOTE — TELEPHONE ENCOUNTER
Patient stated her fingers are normal color but they feel warmer than the rest of her hand and her palm feels cold  Patient has been placed on the schedule at 11:15 today for a cast check with Dr Nevaeh Green

## 2019-08-27 ENCOUNTER — OFFICE VISIT (OUTPATIENT)
Dept: OBGYN CLINIC | Facility: CLINIC | Age: 29
End: 2019-08-27

## 2019-08-27 ENCOUNTER — APPOINTMENT (OUTPATIENT)
Dept: RADIOLOGY | Facility: CLINIC | Age: 29
End: 2019-08-27
Payer: COMMERCIAL

## 2019-08-27 VITALS
BODY MASS INDEX: 20.46 KG/M2 | HEIGHT: 68 IN | DIASTOLIC BLOOD PRESSURE: 72 MMHG | SYSTOLIC BLOOD PRESSURE: 120 MMHG | WEIGHT: 135 LBS

## 2019-08-27 DIAGNOSIS — M79.641 RIGHT HAND PAIN: Primary | ICD-10-CM

## 2019-08-27 DIAGNOSIS — S62.336A DISPLACED FRACTURE OF NECK OF FIFTH METACARPAL BONE, RIGHT HAND, INITIAL ENCOUNTER FOR CLOSED FRACTURE: ICD-10-CM

## 2019-08-27 DIAGNOSIS — M79.641 RIGHT HAND PAIN: ICD-10-CM

## 2019-08-27 PROCEDURE — 73130 X-RAY EXAM OF HAND: CPT

## 2019-08-27 PROCEDURE — 99024 POSTOP FOLLOW-UP VISIT: CPT | Performed by: FAMILY MEDICINE

## 2019-08-27 NOTE — PROGRESS NOTES
Assessment:     1  Right hand pain    2  Displaced fracture of neck of fifth metacarpal bone, right hand, initial encounter for closed fracture        Plan:     Problem List Items Addressed This Visit        Musculoskeletal and Integument    Displaced fracture of neck of fifth metacarpal bone, right hand, initial encounter for closed fracture     Patient with continual evidence of healing on x-ray  Clinically there is very mild palpable tenderness along the 5th metacarpal   At this time, the patient may discontinue her cast and may be transitioned into an ulnar gutter splint  She may remove this during sleep and for hygiene purposes  Patient will return to the office for follow-up in 2 weeks for repeat x-rays of her hand  Consideration at that time will be made for beginning occupational therapy for hand strengthening and range of motion  Other    Right hand pain - Primary    Relevant Orders    XR hand 3+ vw right         Subjective:     Patient ID: Yeison Rivers is a 33 y o  female  Chief Complaint:  Patient with ongoing improvement of pain in her hand  She reports no additional swelling  She denies any numbness or tingling  She denies any warmth or crepitus  Allergy:  Allergies   Allergen Reactions    Amoxicillin     Penicillins      Medications:  all current active meds have been reviewed  Past Medical History:  History reviewed  No pertinent past medical history  Past Surgical History:  History reviewed  No pertinent surgical history  Family History:  Family History   Problem Relation Age of Onset    Diabetes Father      Social History:  Social History     Substance and Sexual Activity   Alcohol Use Yes     Social History     Substance and Sexual Activity   Drug Use No     Social History     Tobacco Use   Smoking Status Current Every Day Smoker    Types: Cigarettes   Smokeless Tobacco Never Used     Review of Systems   Constitutional: Negative  HENT: Negative      Eyes: Negative  Respiratory: Negative  Cardiovascular: Negative  Gastrointestinal: Negative  Genitourinary: Negative  Musculoskeletal: Positive for arthralgias and myalgias  Skin: Negative  Allergic/Immunologic: Negative  Neurological: Negative  Hematological: Negative  Psychiatric/Behavioral: Negative  Objective:  BP Readings from Last 1 Encounters:   08/27/19 120/72      Wt Readings from Last 1 Encounters:   08/27/19 61 2 kg (135 lb)      BMI:   Estimated body mass index is 20 53 kg/m² as calculated from the following:    Height as of this encounter: 5' 8" (1 727 m)  Weight as of this encounter: 61 2 kg (135 lb)  BSA:   Estimated body surface area is 1 73 meters squared as calculated from the following:    Height as of this encounter: 5' 8" (1 727 m)  Weight as of this encounter: 61 2 kg (135 lb)  Physical Exam   Constitutional: She is oriented to person, place, and time  Vital signs are normal  She appears well-developed  HENT:   Head: Normocephalic  Eyes: Pupils are equal, round, and reactive to light  Pulmonary/Chest: Effort normal    Musculoskeletal: Normal range of motion  Neurological: She is alert and oriented to person, place, and time  Skin: Skin is warm and dry  Psychiatric: She has a normal mood and affect  Nursing note and vitals reviewed  Right Hand Exam     Tenderness   The patient is experiencing tenderness in the dorsal area  Range of Motion   Hand   MP Little: abnormal   PIP Little: abnormal   DIP Little: abnormal             I have personally reviewed pertinent films in PACS     X-ray today demonstrates additional callus formation along the volar aspect of the 5th metacarpal

## 2019-08-27 NOTE — ASSESSMENT & PLAN NOTE
Patient with continual evidence of healing on x-ray  Clinically there is very mild palpable tenderness along the 5th metacarpal   At this time, the patient may discontinue her cast and may be transitioned into an ulnar gutter splint  She may remove this during sleep and for hygiene purposes  Patient will return to the office for follow-up in 2 weeks for repeat x-rays of her hand  Consideration at that time will be made for beginning occupational therapy for hand strengthening and range of motion

## 2019-09-10 ENCOUNTER — APPOINTMENT (OUTPATIENT)
Dept: RADIOLOGY | Facility: CLINIC | Age: 29
End: 2019-09-10
Payer: COMMERCIAL

## 2019-09-10 ENCOUNTER — OFFICE VISIT (OUTPATIENT)
Dept: OBGYN CLINIC | Facility: CLINIC | Age: 29
End: 2019-09-10

## 2019-09-10 VITALS
DIASTOLIC BLOOD PRESSURE: 72 MMHG | HEIGHT: 68 IN | BODY MASS INDEX: 20.46 KG/M2 | SYSTOLIC BLOOD PRESSURE: 120 MMHG | WEIGHT: 135 LBS

## 2019-09-10 DIAGNOSIS — M79.641 RIGHT HAND PAIN: ICD-10-CM

## 2019-09-10 DIAGNOSIS — S62.336A DISPLACED FRACTURE OF NECK OF FIFTH METACARPAL BONE, RIGHT HAND, INITIAL ENCOUNTER FOR CLOSED FRACTURE: Primary | ICD-10-CM

## 2019-09-10 PROCEDURE — 73130 X-RAY EXAM OF HAND: CPT

## 2019-09-10 PROCEDURE — 99024 POSTOP FOLLOW-UP VISIT: CPT | Performed by: FAMILY MEDICINE

## 2019-09-10 NOTE — PROGRESS NOTES
Assessment:     1  Displaced fracture of neck of fifth metacarpal bone, right hand, initial encounter for closed fracture    2  Right hand pain        Plan:     Problem List Items Addressed This Visit        Musculoskeletal and Integument    Displaced fracture of neck of fifth metacarpal bone, right hand, initial encounter for closed fracture - Primary     X-ray today shows additional callus formation healing  At this time, the patient may discontinue her ulnar gutter brace and will have her begin occupational therapy for wrist and hand stretching and strengthening  I will see the patient back for follow-up in 2 weeks  Relevant Orders    Ambulatory referral to Occupational Therapy       Other    Right hand pain    Relevant Orders    XR hand 3+ vw right    Ambulatory referral to Occupational Therapy         Subjective:     Patient ID: Skyler Arechiga is a 33 y o  female  Chief Complaint:  HPI  Allergy:  Allergies   Allergen Reactions    Amoxicillin     Penicillins      Medications:  all current active meds have been reviewed  Past Medical History:  History reviewed  No pertinent past medical history  Past Surgical History:  History reviewed  No pertinent surgical history  Family History:  Family History   Problem Relation Age of Onset    Diabetes Father      Social History:  Social History     Substance and Sexual Activity   Alcohol Use Yes     Social History     Substance and Sexual Activity   Drug Use No     Social History     Tobacco Use   Smoking Status Current Every Day Smoker    Types: Cigarettes   Smokeless Tobacco Never Used     Review of Systems   Constitutional: Negative  HENT: Negative  Eyes: Negative  Respiratory: Negative  Cardiovascular: Negative  Gastrointestinal: Negative  Genitourinary: Negative  Musculoskeletal: Positive for arthralgias and myalgias  Skin: Negative  Allergic/Immunologic: Negative  Neurological: Negative  Hematological: Negative  Psychiatric/Behavioral: Negative  Objective:  BP Readings from Last 1 Encounters:   09/10/19 120/72      Wt Readings from Last 1 Encounters:   09/10/19 61 2 kg (135 lb)      BMI:   Estimated body mass index is 20 53 kg/m² as calculated from the following:    Height as of this encounter: 5' 8" (1 727 m)  Weight as of this encounter: 61 2 kg (135 lb)  BSA:   Estimated body surface area is 1 73 meters squared as calculated from the following:    Height as of this encounter: 5' 8" (1 727 m)  Weight as of this encounter: 61 2 kg (135 lb)  Physical Exam   Constitutional: She is oriented to person, place, and time  Vital signs are normal  She appears well-developed  HENT:   Head: Normocephalic  Eyes: Pupils are equal, round, and reactive to light  Pulmonary/Chest: Effort normal    Musculoskeletal: Normal range of motion  Neurological: She is alert and oriented to person, place, and time  Skin: Skin is warm and dry  Psychiatric: She has a normal mood and affect  Nursing note and vitals reviewed  Right Hand Exam     Tenderness   The patient is experiencing tenderness in the dorsal area  Range of Motion   Hand   MP Little: abnormal   PIP Little: abnormal   DIP Little: abnormal             I have personally reviewed pertinent films in PACS  Increased callus formation from previous film study representing ongoing healing

## 2019-09-10 NOTE — ASSESSMENT & PLAN NOTE
X-ray today shows additional callus formation healing  At this time, the patient may discontinue her ulnar gutter brace and will have her begin occupational therapy for wrist and hand stretching and strengthening  I will see the patient back for follow-up in 2 weeks

## 2019-09-16 ENCOUNTER — EVALUATION (OUTPATIENT)
Dept: OCCUPATIONAL THERAPY | Facility: CLINIC | Age: 29
End: 2019-09-16
Payer: COMMERCIAL

## 2019-09-16 DIAGNOSIS — M79.641 RIGHT HAND PAIN: ICD-10-CM

## 2019-09-16 DIAGNOSIS — S62.336A DISPLACED FRACTURE OF NECK OF FIFTH METACARPAL BONE, RIGHT HAND, INITIAL ENCOUNTER FOR CLOSED FRACTURE: ICD-10-CM

## 2019-09-16 DIAGNOSIS — S62.336D CLOSED DISPLACED FRACTURE OF NECK OF FIFTH METACARPAL BONE OF RIGHT HAND WITH ROUTINE HEALING, SUBSEQUENT ENCOUNTER: Primary | ICD-10-CM

## 2019-09-16 PROCEDURE — 97110 THERAPEUTIC EXERCISES: CPT

## 2019-09-16 PROCEDURE — 97165 OT EVAL LOW COMPLEX 30 MIN: CPT
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
## 2019-09-23 ENCOUNTER — APPOINTMENT (OUTPATIENT)
Dept: OCCUPATIONAL THERAPY | Facility: CLINIC | Age: 29
End: 2019-09-23
Payer: COMMERCIAL

## 2019-09-24 ENCOUNTER — OFFICE VISIT (OUTPATIENT)
Dept: OCCUPATIONAL THERAPY | Facility: CLINIC | Age: 29
End: 2019-09-24
Payer: COMMERCIAL

## 2019-09-24 DIAGNOSIS — S62.336D CLOSED DISPLACED FRACTURE OF NECK OF FIFTH METACARPAL BONE OF RIGHT HAND WITH ROUTINE HEALING, SUBSEQUENT ENCOUNTER: Primary | ICD-10-CM

## 2019-09-24 PROCEDURE — 97022 WHIRLPOOL THERAPY: CPT | Performed by: OCCUPATIONAL THERAPIST

## 2019-09-24 PROCEDURE — 97110 THERAPEUTIC EXERCISES: CPT | Performed by: OCCUPATIONAL THERAPIST

## 2019-09-24 PROCEDURE — 97140 MANUAL THERAPY 1/> REGIONS: CPT | Performed by: OCCUPATIONAL THERAPIST

## 2019-09-24 NOTE — PROGRESS NOTES
Daily Note     Today's date: 2019  Patient name: Jazz Arteaga  : 1990  MRN: [id number]  Referring provider: Arabella Causey DO  Dx:   Encounter Diagnosis     ICD-10-CM    1  Closed displaced fracture of neck of fifth metacarpal bone of right hand with routine healing, subsequent encounter M73 413T                   Subjective: I have some stiffness      Objective: See treatment diary below      Assessment: Tolerated treatment well  Patient has full ROM   Plan: Continue per plan of care        Precautions: RSF MC fx 19      Manual             Graston R hand  4m           MOB MCP/PIP  2m           Retrograde   2m           Flexor/intrinsic   stretches  2m           Ktape extensors  lovely               Exercise Diary             HEP TGE, TP            TGE x10 2x10           Praying hands 5" x 10            TG , pinch 5'            Wrist e/f  2#  3x10           powerweb    Blue  3x10           flexbar p/s  Red  3x10                                                                                                                                                                                        Modalities             fluido pre  10m

## 2019-09-30 ENCOUNTER — OFFICE VISIT (OUTPATIENT)
Dept: OCCUPATIONAL THERAPY | Facility: CLINIC | Age: 29
End: 2019-09-30
Payer: COMMERCIAL

## 2019-09-30 ENCOUNTER — TELEPHONE (OUTPATIENT)
Dept: OBGYN CLINIC | Facility: HOSPITAL | Age: 29
End: 2019-09-30

## 2019-09-30 ENCOUNTER — OFFICE VISIT (OUTPATIENT)
Dept: OBGYN CLINIC | Facility: CLINIC | Age: 29
End: 2019-09-30

## 2019-09-30 VITALS
BODY MASS INDEX: 20.46 KG/M2 | HEIGHT: 68 IN | SYSTOLIC BLOOD PRESSURE: 120 MMHG | DIASTOLIC BLOOD PRESSURE: 72 MMHG | WEIGHT: 135 LBS

## 2019-09-30 DIAGNOSIS — S62.336D CLOSED DISPLACED FRACTURE OF NECK OF FIFTH METACARPAL BONE OF RIGHT HAND WITH ROUTINE HEALING, SUBSEQUENT ENCOUNTER: Primary | ICD-10-CM

## 2019-09-30 DIAGNOSIS — S62.336A DISPLACED FRACTURE OF NECK OF FIFTH METACARPAL BONE, RIGHT HAND, INITIAL ENCOUNTER FOR CLOSED FRACTURE: ICD-10-CM

## 2019-09-30 DIAGNOSIS — M79.641 RIGHT HAND PAIN: Primary | ICD-10-CM

## 2019-09-30 DIAGNOSIS — M79.641 RIGHT HAND PAIN: ICD-10-CM

## 2019-09-30 PROCEDURE — 97140 MANUAL THERAPY 1/> REGIONS: CPT | Performed by: OCCUPATIONAL THERAPIST

## 2019-09-30 PROCEDURE — 97022 WHIRLPOOL THERAPY: CPT | Performed by: OCCUPATIONAL THERAPIST

## 2019-09-30 PROCEDURE — 99024 POSTOP FOLLOW-UP VISIT: CPT | Performed by: FAMILY MEDICINE

## 2019-09-30 PROCEDURE — 97110 THERAPEUTIC EXERCISES: CPT | Performed by: OCCUPATIONAL THERAPIST
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
## 2019-09-30 NOTE — PROGRESS NOTES
Assessment:     1  Right hand pain    2  Displaced fracture of neck of fifth metacarpal bone, right hand, initial encounter for closed fracture        Plan:     Problem List Items Addressed This Visit        Musculoskeletal and Integument    Displaced fracture of neck of fifth metacarpal bone, right hand, initial encounter for closed fracture       Other    Right hand pain - Primary         Subjective:     Patient ID: Red Avila is a 33 y o  female  Chief Complaint:  Patient reports good improvement of hand pain symptoms since beginning with physical therapy  She remains compliant with doing her home exercising programs as instructed  She does continue to report some tightness and stiffness along the flexor and extensor surfaces of her wrist   She reports no pain at the site of her fracture  Allergy:  Allergies   Allergen Reactions    Amoxicillin     Penicillins      Medications:  all current active meds have been reviewed  Past Medical History:  History reviewed  No pertinent past medical history  Past Surgical History:  History reviewed  No pertinent surgical history  Family History:  Family History   Problem Relation Age of Onset    Diabetes Father      Social History:  Social History     Substance and Sexual Activity   Alcohol Use Yes     Social History     Substance and Sexual Activity   Drug Use No     Social History     Tobacco Use   Smoking Status Current Every Day Smoker    Types: Cigarettes   Smokeless Tobacco Never Used     Review of Systems   Constitutional: Negative  HENT: Negative  Eyes: Negative  Respiratory: Negative  Cardiovascular: Negative  Gastrointestinal: Negative  Genitourinary: Negative  Musculoskeletal: Positive for arthralgias and myalgias  Skin: Negative  Allergic/Immunologic: Negative  Neurological: Negative  Hematological: Negative  Psychiatric/Behavioral: Negative            Objective:  BP Readings from Last 1 Encounters:   09/30/19 120/72      Wt Readings from Last 1 Encounters:   09/30/19 61 2 kg (135 lb)      BMI:   Estimated body mass index is 20 53 kg/m² as calculated from the following:    Height as of this encounter: 5' 8" (1 727 m)  Weight as of this encounter: 61 2 kg (135 lb)  BSA:   Estimated body surface area is 1 73 meters squared as calculated from the following:    Height as of this encounter: 5' 8" (1 727 m)  Weight as of this encounter: 61 2 kg (135 lb)  Physical Exam   Constitutional: She is oriented to person, place, and time  Vital signs are normal  She appears well-developed  HENT:   Head: Normocephalic  Eyes: Pupils are equal, round, and reactive to light  Pulmonary/Chest: Effort normal    Musculoskeletal: Normal range of motion  Neurological: She is alert and oriented to person, place, and time  Skin: Skin is warm and dry  Psychiatric: She has a normal mood and affect  Nursing note and vitals reviewed  Right Hand Exam     Tenderness   The patient is experiencing tenderness in the dorsal area      Range of Motion   Hand   MP Little: abnormal   PIP Little: abnormal   DIP Little: abnormal

## 2019-09-30 NOTE — TELEPHONE ENCOUNTER
Caller: patient  Call back number: [phone]  Patient's doctor: Dr Jose R Gipson    Patient states her disability insurance sent over paperwork that needs to be filled out to extend it  She is asking if this was completed and faxed back yet   Please advise

## 2019-10-07 ENCOUNTER — APPOINTMENT (OUTPATIENT)
Dept: OCCUPATIONAL THERAPY | Facility: CLINIC | Age: 29
End: 2019-10-07
Payer: COMMERCIAL

## 2019-10-08 ENCOUNTER — OFFICE VISIT (OUTPATIENT)
Dept: OCCUPATIONAL THERAPY | Facility: CLINIC | Age: 29
End: 2019-10-08
Payer: COMMERCIAL

## 2019-10-08 ENCOUNTER — OFFICE VISIT (OUTPATIENT)
Dept: OBGYN CLINIC | Facility: CLINIC | Age: 29
End: 2019-10-08
Payer: COMMERCIAL

## 2019-10-08 VITALS
HEIGHT: 68 IN | WEIGHT: 135 LBS | DIASTOLIC BLOOD PRESSURE: 70 MMHG | BODY MASS INDEX: 20.46 KG/M2 | SYSTOLIC BLOOD PRESSURE: 120 MMHG

## 2019-10-08 DIAGNOSIS — S62.336D CLOSED DISPLACED FRACTURE OF NECK OF FIFTH METACARPAL BONE OF RIGHT HAND WITH ROUTINE HEALING, SUBSEQUENT ENCOUNTER: Primary | ICD-10-CM

## 2019-10-08 DIAGNOSIS — S62.336A DISPLACED FRACTURE OF NECK OF FIFTH METACARPAL BONE, RIGHT HAND, INITIAL ENCOUNTER FOR CLOSED FRACTURE: ICD-10-CM

## 2019-10-08 DIAGNOSIS — S62.366D CLOSED NONDISPLACED FRACTURE OF NECK OF FIFTH METACARPAL BONE OF RIGHT HAND WITH ROUTINE HEALING, SUBSEQUENT ENCOUNTER: Primary | ICD-10-CM

## 2019-10-08 DIAGNOSIS — M79.641 RIGHT HAND PAIN: ICD-10-CM

## 2019-10-08 PROCEDURE — 97530 THERAPEUTIC ACTIVITIES: CPT | Performed by: OCCUPATIONAL THERAPIST

## 2019-10-08 PROCEDURE — 99213 OFFICE O/P EST LOW 20 MIN: CPT | Performed by: FAMILY MEDICINE

## 2019-10-08 PROCEDURE — 97110 THERAPEUTIC EXERCISES: CPT | Performed by: OCCUPATIONAL THERAPIST

## 2019-10-08 PROCEDURE — 97022 WHIRLPOOL THERAPY: CPT | Performed by: OCCUPATIONAL THERAPIST

## 2019-10-08 PROCEDURE — 97140 MANUAL THERAPY 1/> REGIONS: CPT | Performed by: OCCUPATIONAL THERAPIST

## 2019-10-08 NOTE — PROGRESS NOTES
Assessment:     1  Closed nondisplaced fracture of neck of fifth metacarpal bone of right hand with routine healing, subsequent encounter        Plan:     Problem List Items Addressed This Visit        Musculoskeletal and Integument    Closed nondisplaced fracture of neck of fifth metacarpal bone of right hand - Primary         Subjective:     Patient ID: Diya Grove is a 33 y o  female  Chief Complaint:  Patient remains symptom-free  She is no longer experiencing any pain or weakness strain hand  She has been compliant with doing home exercises as instructed by her hand therapist     Allergy:  Allergies   Allergen Reactions    Amoxicillin     Penicillins      Medications:  all current active meds have been reviewed  Past Medical History:  History reviewed  No pertinent past medical history  Past Surgical History:  History reviewed  No pertinent surgical history  Family History:  Family History   Problem Relation Age of Onset    Diabetes Father      Social History:  Social History     Substance and Sexual Activity   Alcohol Use Yes     Social History     Substance and Sexual Activity   Drug Use No     Social History     Tobacco Use   Smoking Status Current Every Day Smoker    Types: Cigarettes   Smokeless Tobacco Never Used     Review of Systems   Constitutional: Negative  HENT: Negative  Eyes: Negative  Respiratory: Negative  Cardiovascular: Negative  Gastrointestinal: Negative  Genitourinary: Negative  Musculoskeletal: Positive for arthralgias and myalgias  Skin: Negative  Allergic/Immunologic: Negative  Neurological: Negative  Hematological: Negative  Psychiatric/Behavioral: Negative  Objective:  BP Readings from Last 1 Encounters:   10/08/19 120/70      Wt Readings from Last 1 Encounters:   10/08/19 61 2 kg (135 lb)      BMI:   Estimated body mass index is 20 53 kg/m² as calculated from the following:    Height as of this encounter: 5' 8" (1 727 m)  Weight as of this encounter: 61 2 kg (135 lb)  BSA:   Estimated body surface area is 1 73 meters squared as calculated from the following:    Height as of this encounter: 5' 8" (1 727 m)  Weight as of this encounter: 61 2 kg (135 lb)  Physical Exam   Constitutional: She is oriented to person, place, and time  Vital signs are normal  She appears well-developed  HENT:   Head: Normocephalic  Eyes: Pupils are equal, round, and reactive to light  Pulmonary/Chest: Effort normal    Musculoskeletal: Normal range of motion  Neurological: She is alert and oriented to person, place, and time  Skin: Skin is warm and dry  Psychiatric: She has a normal mood and affect  Nursing note and vitals reviewed  Right Hand Exam   Right hand exam is normal     Range of Motion   The patient has normal right wrist ROM  Muscle Strength   The patient has normal right wrist strength
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
## 2019-10-08 NOTE — LETTER
October 8, 2019     Patient: David Chaney   YOB: 1990   Date of Visit: 10/8/2019       To Whom it May Concern:    David Chaney is under my professional care  She was seen in my office on 10/8/2019  She may return to work on 10/14/19  If you have any questions or concerns, please don't hesitate to call           Sincerely,          Reinaldo Hunt,         CC: David Chaney

## 2019-11-26 ENCOUNTER — APPOINTMENT (OUTPATIENT)
Dept: RADIOLOGY | Facility: CLINIC | Age: 29
End: 2019-11-26
Payer: OTHER MISCELLANEOUS

## 2019-11-26 ENCOUNTER — APPOINTMENT (OUTPATIENT)
Dept: URGENT CARE | Facility: CLINIC | Age: 29
End: 2019-11-26
Payer: OTHER MISCELLANEOUS

## 2019-11-26 DIAGNOSIS — S69.91XA FINGER INJURY, RIGHT, INITIAL ENCOUNTER: ICD-10-CM

## 2019-11-26 DIAGNOSIS — S69.91XA FINGER INJURY, RIGHT, INITIAL ENCOUNTER: Primary | ICD-10-CM

## 2019-11-26 PROCEDURE — 99283 EMERGENCY DEPT VISIT LOW MDM: CPT

## 2019-11-26 PROCEDURE — G0382 LEV 3 HOSP TYPE B ED VISIT: HCPCS

## 2019-11-26 PROCEDURE — 73130 X-RAY EXAM OF HAND: CPT

## 2021-05-26 ENCOUNTER — APPOINTMENT (EMERGENCY)
Dept: RADIOLOGY | Facility: HOSPITAL | Age: 31
End: 2021-05-26
Payer: COMMERCIAL

## 2021-05-26 ENCOUNTER — HOSPITAL ENCOUNTER (EMERGENCY)
Facility: HOSPITAL | Age: 31
Discharge: HOME | End: 2021-05-27
Attending: EMERGENCY MEDICINE
Payer: COMMERCIAL

## 2021-05-26 VITALS
DIASTOLIC BLOOD PRESSURE: 72 MMHG | OXYGEN SATURATION: 99 % | SYSTOLIC BLOOD PRESSURE: 128 MMHG | HEART RATE: 91 BPM | TEMPERATURE: 98.5 F | WEIGHT: 120 LBS | BODY MASS INDEX: 18.25 KG/M2 | RESPIRATION RATE: 18 BRPM

## 2021-05-26 DIAGNOSIS — S00.81XA ABRASION OF FACE, INITIAL ENCOUNTER: Primary | ICD-10-CM

## 2021-05-26 DIAGNOSIS — S09.90XA INJURY OF HEAD, INITIAL ENCOUNTER: ICD-10-CM

## 2021-05-26 PROCEDURE — 99284 EMERGENCY DEPT VISIT MOD MDM: CPT | Performed by: EMERGENCY MEDICINE

## 2021-05-26 PROCEDURE — 90715 TDAP VACCINE 7 YRS/> IM: CPT | Performed by: EMERGENCY MEDICINE

## 2021-05-26 PROCEDURE — 99283 EMERGENCY DEPT VISIT LOW MDM: CPT

## 2021-05-26 PROCEDURE — 70450 CT HEAD/BRAIN W/O DYE: CPT

## 2021-05-26 PROCEDURE — 90471 IMMUNIZATION ADMIN: CPT

## 2021-05-26 RX ORDER — ACETAMINOPHEN 325 MG/1
975 TABLET ORAL ONCE
Status: COMPLETED | OUTPATIENT
Start: 2021-05-26 | End: 2021-05-26

## 2021-05-26 RX ORDER — ONDANSETRON 4 MG/1
4 TABLET, ORALLY DISINTEGRATING ORAL ONCE
Status: COMPLETED | OUTPATIENT
Start: 2021-05-26 | End: 2021-05-26

## 2021-05-26 RX ADMIN — ACETAMINOPHEN 975 MG: 325 TABLET, FILM COATED ORAL at 22:53

## 2021-05-26 RX ADMIN — ONDANSETRON 4 MG: 4 TABLET, ORALLY DISINTEGRATING ORAL at 22:53

## 2021-05-26 RX ADMIN — TETANUS TOXOID, REDUCED DIPHTHERIA TOXOID AND ACELLULAR PERTUSSIS VACCINE, ADSORBED 0.5 ML: 5; 2.5; 8; 8; 2.5 SUSPENSION INTRAMUSCULAR at 22:53
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
## 2021-06-01 ENCOUNTER — OFFICE VISIT (OUTPATIENT)
Dept: URGENT CARE | Facility: CLINIC | Age: 31
End: 2021-06-01
Payer: COMMERCIAL

## 2021-06-01 ENCOUNTER — APPOINTMENT (OUTPATIENT)
Dept: RADIOLOGY | Facility: CLINIC | Age: 31
End: 2021-06-01
Payer: COMMERCIAL

## 2021-06-01 VITALS
SYSTOLIC BLOOD PRESSURE: 101 MMHG | DIASTOLIC BLOOD PRESSURE: 63 MMHG | TEMPERATURE: 97.5 F | RESPIRATION RATE: 18 BRPM | HEART RATE: 98 BPM | WEIGHT: 125 LBS | BODY MASS INDEX: 18.51 KG/M2 | OXYGEN SATURATION: 97 % | HEIGHT: 69 IN

## 2021-06-01 DIAGNOSIS — S69.91XA INJURY OF RIGHT THUMB, INITIAL ENCOUNTER: ICD-10-CM

## 2021-06-01 DIAGNOSIS — S69.91XA INJURY OF RIGHT THUMB, INITIAL ENCOUNTER: Primary | ICD-10-CM

## 2021-06-01 PROCEDURE — 73140 X-RAY EXAM OF FINGER(S): CPT

## 2021-06-01 PROCEDURE — 99213 OFFICE O/P EST LOW 20 MIN: CPT | Performed by: PHYSICIAN ASSISTANT

## 2021-06-01 RX ORDER — SUMATRIPTAN 50 MG/1
TABLET, FILM COATED ORAL
COMMUNITY
Start: 2021-03-05
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
## 2021-06-01 NOTE — LETTER
June 1, 2021     Patient: Scarlet Jackson   YOB: 1990   Date of Visit: 6/1/2021       To Whom it May Concern:    Scarlet Jackson was seen in my clinic on 6/1/2021       Sincerely,          Julia Loaiza PA-C        CC: No Recipients

## 2021-06-09 ENCOUNTER — OFFICE VISIT (OUTPATIENT)
Dept: OBGYN CLINIC | Facility: MEDICAL CENTER | Age: 31
End: 2021-06-09
Payer: COMMERCIAL

## 2021-06-09 VITALS
DIASTOLIC BLOOD PRESSURE: 73 MMHG | TEMPERATURE: 97.5 F | HEIGHT: 69 IN | WEIGHT: 125 LBS | HEART RATE: 108 BPM | BODY MASS INDEX: 18.51 KG/M2 | SYSTOLIC BLOOD PRESSURE: 124 MMHG

## 2021-06-09 DIAGNOSIS — S69.91XA INJURY OF RIGHT THUMB, INITIAL ENCOUNTER: ICD-10-CM

## 2021-06-09 PROCEDURE — 99214 OFFICE O/P EST MOD 30 MIN: CPT | Performed by: STUDENT IN AN ORGANIZED HEALTH CARE EDUCATION/TRAINING PROGRAM

## 2021-06-09 PROCEDURE — 29075 APPL CST ELBW FNGR SHORT ARM: CPT | Performed by: STUDENT IN AN ORGANIZED HEALTH CARE EDUCATION/TRAINING PROGRAM
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
## 2021-06-09 NOTE — LETTER
June 9, 2021     Patient: Sandra Chauhan   YOB: 1990   Date of Visit: 6/9/2021       To Whom it May Concern:    Sandra Chauhan is under my professional care  She was seen in my office on 6/9/2021  She is restricted to office/adminstrative duties of her right hand while casted for the next 4-6 weeks  Will be re-evaluated in approximately 5 weeks  If you have any questions or concerns, please don't hesitate to call           Sincerely,          Minnie Guerra MD        CC: Sandra Starkskimberley

## 2021-06-09 NOTE — PATIENT INSTRUCTIONS
Suspected radial collateral ligament injury of your right thumb  Treatment is typically non-operative but need to be casted for 4-6 weeks

## 2021-06-30 ENCOUNTER — TELEPHONE (OUTPATIENT)
Dept: OBGYN CLINIC | Facility: MEDICAL CENTER | Age: 31
End: 2021-06-30

## 2021-06-30 NOTE — TELEPHONE ENCOUNTER
Patient sees Dr Chastity Gerber  Patient calling about her cast, padding has wore off and is cutting into her thumb  Her next appt is on 7/14/2021, she is wondering if she can have  A new cast put on sooner       # [phone]

## 2021-07-01 ENCOUNTER — OFFICE VISIT (OUTPATIENT)
Dept: OBGYN CLINIC | Facility: MEDICAL CENTER | Age: 31
End: 2021-07-01
Payer: COMMERCIAL

## 2021-07-01 VITALS
HEIGHT: 69 IN | DIASTOLIC BLOOD PRESSURE: 76 MMHG | HEART RATE: 102 BPM | SYSTOLIC BLOOD PRESSURE: 117 MMHG | BODY MASS INDEX: 18.51 KG/M2 | WEIGHT: 125 LBS

## 2021-07-01 DIAGNOSIS — S69.91XD INJURY OF RIGHT THUMB, SUBSEQUENT ENCOUNTER: Primary | ICD-10-CM

## 2021-07-01 DIAGNOSIS — Z47.89 PROBLEM WITH FIBERGLASS CAST: ICD-10-CM

## 2021-07-01 PROCEDURE — 99212 OFFICE O/P EST SF 10 MIN: CPT | Performed by: EMERGENCY MEDICINE

## 2021-07-01 PROCEDURE — 29075 APPL CST ELBW FNGR SHORT ARM: CPT | Performed by: EMERGENCY MEDICINE

## 2021-07-01 NOTE — PATIENT INSTRUCTIONS
Cast Care   AMBULATORY CARE:   Cast care  will help the cast dry and harden correctly, and then protect it until it comes off  Your cast may need up to 48 hours to dry and harden completely  Even after your cast hardens, it can be damaged  Seek care immediately if:   · Your cast breaks or gets damaged  · You see drainage, or your cast is stained or smells bad  · Your skin turns blue or pale  · Your skin tingles, burns, or is cold or numb  · You have severe pain that is getting worse and does not go away after you take pain medicine  · Your limb swells, or your cast looks or feels tighter than it was before  Contact your healthcare provider if:   · Something falls into your cast and gets stuck  · You have itching, pain, burning, or weakness where you have the cast      · You have a fever  · You have sores, blisters, or breaks on the skin around the edges of the cast     · You have questions or concerns about your condition or care  Care for your cast while it hardens:   · Protect the cast   Do not put weight on the cast  Do not bend, lean on, or hit the cast with anything  Use the palms of your hands when you move the cast  Do not use your fingers  Your fingers may leave marks on the cast as it dries  · Change positions often  Change your position every 2 hours to help the cast dry faster  Prop your cast on something soft, such as a pillow, to prevent a flat area on your cast      · Keep the cast dry  Tie plastic trash bags around your cast to keep it dry while you bathe  You may use a blow dryer on cool or the lowest heat setting to dry your cast if it gets wet  Do not use a high heat setting, because you may burn your skin  Certain casts can get wet  Ask if you have a waterproof cast     Care for your cast after it hardens:   · Check your cast every day    Contact your healthcare provider if you notice any cracks, dents, holes, or flaking on your cast      · Keep your cast clean and dry  Cover your cast with a towel when you eat  You may have a small piece of cast that can be removed to check on incisions under your cast  Make sure the small piece of cast is kept tightly closed  If your cast gets dirty, use a mild detergent and a damp washcloth to wipe off the outside of your cast  Continue to cover your cast with trash bags to keep it dry while you bathe  · Care for the edges of your cast   Cover the cast edges to keep them smooth  Use 4 inch pieces of waterproof tape  Place one end of the tape under the inside edge of your cast and fold it over to the outside surface  Overlap tape strips until the edges are completely covered  Change the tape as directed  Do not pull or repair any of the padding from inside the cast  This could cause blisters and sores on the skin under your cast      · Keep weight off your cast   Do not let anyone push down or lean on your cast  This may cause it to break  · Do not use sharp objects  Do not use a sharp or pointed object to scratch under your cast  This may cause wounds that can get infected, or you may lose the item inside the cast  If your skin itches, blow cool air under the cast  You may also gently scratch your skin outside the cast with a cloth  Follow up with your healthcare provider as directed: You will need to return to have your cast removed and your bones checked  Write down your questions so you remember to ask them during your visits  © Copyright [address] Information is for End User's use only and may not be sold, redistributed or otherwise used for commercial purposes  All illustrations and images included in CareNotes® are the copyrighted property of [employer]  or [employer]   The above information is an  only  It is not intended as medical advice for individual conditions or treatments   Talk to your doctor, nurse or pharmacist before following any medical regimen to see if it is safe and effective for you

## 2021-07-01 NOTE — PROGRESS NOTES
Assessment/Plan:    Diagnoses and all orders for this visit:    Injury of right thumb, subsequent encounter  -     Cast application    Problem with fiberglass cast    Old cast removed, new cast placed    Return for as scheduled with Dr Tigist Lange  Chief Complaint:     Chief Complaint   Patient presents with    Right Hand - Follow-up       Subjective:   Patient ID: Angelina Harris is a 31 y o  female  Patient presents for cast check as she has gotten the cast wet several times denies any pain or new symptoms  She is treating with Dr Tigist Lange for thumb injury      Review of Systems    The following portions of the patient's chart were reviewed and updated as appropriate: Allergy:    Allergies   Allergen Reactions    Amoxicillin     Penicillins        History reviewed  No pertinent past medical history  History reviewed  No pertinent surgical history  Social History     Socioeconomic History    Marital status: Single     Spouse name: Not on file    Number of children: Not on file    Years of education: Not on file    Highest education level: Not on file   Occupational History    Not on file   Tobacco Use    Smoking status: Current Every Day Smoker     Types: Cigarettes    Smokeless tobacco: Never Used   Vaping Use    Vaping Use: Never used   Substance and Sexual Activity    Alcohol use: Not Currently    Drug use: No    Sexual activity: Not on file   Other Topics Concern    Not on file   Social History Narrative    Not on file     Social Determinants of Health     Financial Resource Strain:     Difficulty of Paying Living Expenses:    Food Insecurity:     Worried About Running Out of Food in the Last Year:     Ran Out of Food in the Last Year:    Transportation Needs:     Lack of Transportation (Medical):      Lack of Transportation (Non-Medical):    Physical Activity:     Days of Exercise per Week:     Minutes of Exercise per Session:    Stress:     Feeling of Stress :    Social Connections:     Frequency of Communication with Friends and Family:     Frequency of Social Gatherings with Friends and Family:     Attends Protestant Services:     Active Member of Clubs or Organizations:     Attends Club or Organization Meetings:     Marital Status:    Intimate Partner Violence:     Fear of Current or Ex-Partner:     Emotionally Abused:     Physically Abused:     Sexually Abused:        Family History   Problem Relation Age of Onset    Diabetes Father        Medications:    Current Outpatient Medications:     CITALOPRAM HYDROBROMIDE PO, Take 10 mg by mouth daily , Disp: , Rfl:     clonazePAM (KlonoPIN) 0 5 mg tablet, , Disp: , Rfl:     SUMAtriptan (IMITREX) 50 mg tablet, TAKE 1 TABLET BY MOUTH BY MOUTH ONCE AS NEEDED FOR MIGRAINE MAY REPEAT IN 2 HOURS IF UNRESVOLED, Disp: , Rfl:     Patient Active Problem List   Diagnosis    Closed nondisplaced fracture of neck of fifth metacarpal bone of right hand    Displaced fracture of neck of fifth metacarpal bone, right hand, initial encounter for closed fracture    Right hand pain       Objective:  /76   Pulse 102   Ht 5' 9" (1 753 m)   Wt 56 7 kg (125 lb)   BMI 18 46 kg/m²     Right Hand Exam     Other   Erythema: absent  Sensation: normal  Pulse: present    Comments:  Skin well appearing            Physical Exam      Neurologic Exam    Cast application    Date/Time: 7/1/2021 2:09 PM  Performed by: Trisha Reed MD  Authorized by: Trisha Reed MD   Universal Protocol:  Consent: Verbal consent obtained  Risks and benefits: risks, benefits and alternatives were discussed  Consent given by: patient  Timeout called at: 7/1/2021 2:10 PM   Patient identity confirmed: verbally with patient      Pre-procedure details:     Sensation:  Normal  Procedure details:     Laterality:  Right    Location:  Wrist    Wrist:  R wristCast type: short arm thumb spica      Supplies:  Cotton padding and fiberglass  Post-procedure details:     Pain: Improved    Sensation:  Normal    Skin color:  Pink    Patient tolerance of procedure:   Tolerated well, no immediate complications

## 2021-07-08 ENCOUNTER — TELEPHONE (OUTPATIENT)
Dept: OBGYN CLINIC | Facility: MEDICAL CENTER | Age: 31
End: 2021-07-08
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
## 2021-07-14 ENCOUNTER — OFFICE VISIT (OUTPATIENT)
Dept: OBGYN CLINIC | Facility: MEDICAL CENTER | Age: 31
End: 2021-07-14
Payer: COMMERCIAL

## 2021-07-14 VITALS
HEART RATE: 103 BPM | DIASTOLIC BLOOD PRESSURE: 75 MMHG | HEIGHT: 69 IN | WEIGHT: 125 LBS | BODY MASS INDEX: 18.51 KG/M2 | SYSTOLIC BLOOD PRESSURE: 123 MMHG

## 2021-07-14 DIAGNOSIS — S69.91XD INJURY OF RIGHT THUMB, SUBSEQUENT ENCOUNTER: Primary | ICD-10-CM

## 2021-07-14 PROCEDURE — 99213 OFFICE O/P EST LOW 20 MIN: CPT | Performed by: STUDENT IN AN ORGANIZED HEALTH CARE EDUCATION/TRAINING PROGRAM

## 2021-07-14 NOTE — LETTER
July 14, 2021     Patient: Mikhail Hackett   YOB: 1990   Date of Visit: 7/14/2021       To Whom it May Concern:    Mikhail Hackett is under my professional care  She was seen in my office on 7/14/2021  Patient can return to work without restrictions on 07/19/2021  If you have any questions or concerns, please don't hesitate to call           Sincerely,          Maurice Sanders MD        CC: Mikhail Hackett
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
## 2021-07-19 ENCOUNTER — TELEPHONE (OUTPATIENT)
Dept: OBGYN CLINIC | Facility: HOSPITAL | Age: 31
End: 2021-07-19

## 2022-02-02 ENCOUNTER — APPOINTMENT (OUTPATIENT)
Dept: PHYSICAL THERAPY | Facility: CLINIC | Age: 32
End: 2022-02-02

## 2022-02-02 PROCEDURE — 97530 THERAPEUTIC ACTIVITIES: CPT | Performed by: PHYSICAL THERAPIST

## 2022-02-13 ENCOUNTER — OFFICE VISIT (OUTPATIENT)
Dept: URGENT CARE | Age: 32
End: 2022-02-13

## 2022-02-13 VITALS
TEMPERATURE: 98.3 F | HEIGHT: 69 IN | OXYGEN SATURATION: 99 % | HEART RATE: 97 BPM | BODY MASS INDEX: 18.51 KG/M2 | WEIGHT: 125 LBS | DIASTOLIC BLOOD PRESSURE: 70 MMHG | SYSTOLIC BLOOD PRESSURE: 119 MMHG | RESPIRATION RATE: 18 BRPM

## 2022-02-13 DIAGNOSIS — N89.8 VAGINAL DISCHARGE: ICD-10-CM

## 2022-02-13 DIAGNOSIS — Z20.2 STD EXPOSURE: Primary | ICD-10-CM

## 2022-02-13 PROCEDURE — 96372 THER/PROPH/DIAG INJ SC/IM: CPT | Performed by: PHYSICIAN ASSISTANT

## 2022-02-13 PROCEDURE — G0382 LEV 3 HOSP TYPE B ED VISIT: HCPCS | Performed by: PHYSICIAN ASSISTANT

## 2022-02-13 PROCEDURE — 87491 CHLMYD TRACH DNA AMP PROBE: CPT | Performed by: PHYSICIAN ASSISTANT

## 2022-02-13 PROCEDURE — 87591 N.GONORRHOEAE DNA AMP PROB: CPT | Performed by: PHYSICIAN ASSISTANT

## 2022-02-13 RX ORDER — LIDOCAINE HYDROCHLORIDE 10 MG/ML
1 INJECTION, SOLUTION INFILTRATION; PERINEURAL ONCE
Status: COMPLETED | OUTPATIENT
Start: 2022-02-13 | End: 2022-02-13

## 2022-02-13 RX ORDER — CEFTRIAXONE SODIUM 250 MG/1
500 INJECTION, POWDER, FOR SOLUTION INTRAMUSCULAR; INTRAVENOUS ONCE
Status: COMPLETED | OUTPATIENT
Start: 2022-02-13 | End: 2022-02-13

## 2022-02-13 RX ORDER — DOXYCYCLINE 100 MG/1
100 TABLET ORAL 2 TIMES DAILY
Qty: 14 TABLET | Refills: 0 | Status: SHIPPED | OUTPATIENT
Start: 2022-02-13 | End: 2022-02-20

## 2022-02-13 RX ADMIN — CEFTRIAXONE SODIUM 500 MG: 250 INJECTION, POWDER, FOR SOLUTION INTRAMUSCULAR; INTRAVENOUS at 09:44

## 2022-02-13 RX ADMIN — LIDOCAINE HYDROCHLORIDE 1 ML: 10 INJECTION, SOLUTION INFILTRATION; PERINEURAL at 09:44

## 2022-02-13 NOTE — PATIENT INSTRUCTIONS
Cervicitis   WHAT YOU NEED TO KNOW:   Cervicitis inflammation of your cervix  Your cervix is at the bottom of your uterus where it opens into your vagina  DISCHARGE INSTRUCTIONS:   Medicines:   · Antibiotics: This medicine helps kill the bacteria causing cervicitis  Take them as directed  · Take your medicine as directed  Contact your healthcare provider if you think your medicine is not helping or if you have side effects  Tell him of her if you are allergic to any medicine  Keep a list of the medicines, vitamins, and herbs you take  Include the amounts, and when and why you take them  Bring the list or the pill bottles to follow-up visits  Carry your medicine list with you in case of an emergency  Follow up with your healthcare provider or gynecologist as directed: You may need to return to have your cervix checked or more tests done  Write down your questions so you remember to ask them during your visits  Activity:  You may need to stop having sex until after you finish taking medicine to treat your condition  If you had other procedures to treat your condition, you may need to stop having sex for some time  Ask your healthcare provider or gynecologist when you can have sex or return to your normal activities  Prevent cervicitis:   · Avoid products that can cause irritation:  Do not douche unless your healthcare provider or gynecologist has told you to  Do not use spermicides if they caused symptoms in the past      · Use a condom:  Use a latex condom every time you have sex  If you are allergic to latex, use a nonlatex condom  · Limit your sexual partners: Your risk of getting an STI is decreased if you have fewer sexual partners  Do not have sex with someone who has or is being treated for a STI  · Talk to your sexual partners: If you have a STI, tell your recent sexual partners   Tell them to see a healthcare provider for testing and treatment to help stop the spread of infection  Contact your healthcare provider or gynecologist if:   · You are spotting blood from your vagina and it is not time for your period  · You have yellow or green discharge coming from your vagina after you start treatment  · You have abdominal pain  · You have a fever  · You think or know you are pregnant  · Your symptoms do not go away 2 to 4 weeks after treatment  · You have questions or concerns about your condition or care  Return to the emergency department if:   · You have bleeding from your vagina that does not stop and it is not time for your period  © Copyright [employer] 2021 Information is for End User's use only and may not be sold, redistributed or otherwise used for commercial purposes  All illustrations and images included in CareNotes® are the copyrighted property of [employer]  or [employer]  The above information is an  only  It is not intended as medical advice for individual conditions or treatments  Talk to your doctor, nurse or pharmacist before following any medical regimen to see if it is safe and effective for you

## 2022-02-13 NOTE — PROGRESS NOTES
[address] [facility]        NAME: Mic Lauren is a 27 y o  female  : 1990    MRN: [id number]  DATE: 2022  TIME: 9:39 AM    Assessment and Plan   STD exposure [Z20 2]  1  STD exposure  Chlamydia/GC amplified DNA by PCR    cefTRIAXone (ROCEPHIN) injection 500 mg    doxycycline (ADOXA) 100 MG tablet    lidocaine (XYLOCAINE) 1 % injection 1 mL   2  Vaginal discharge           Patient Instructions       Follow up with PCP in 3-5 days  Proceed to  ER if symptoms worsen  Chief Complaint     Chief Complaint   Patient presents with    STD exposure     possible STD exposure         History of Present Illness       Patient is a 26 y/o/f [facility] with STD exposure and vaginal discharge  Patient reports sexual partner informed her of having an STD  Last exposure was 9-10 days ago  Pt reports a white-kirstie discharge, possible yeast infection  No vaginal burning or pain  Review of Systems   Review of Systems   Constitutional: Negative for chills and fever  HENT: Negative for ear pain and sore throat  Eyes: Negative for pain and visual disturbance  Respiratory: Negative for cough and shortness of breath  Cardiovascular: Negative for chest pain and palpitations  Gastrointestinal: Negative for abdominal pain and vomiting  Genitourinary: Positive for vaginal discharge  Negative for dysuria and hematuria  Musculoskeletal: Negative for arthralgias and back pain  Skin: Negative for color change and rash  Neurological: Negative for seizures and syncope  All other systems reviewed and are negative          Current Medications       Current Outpatient Medications:     CITALOPRAM HYDROBROMIDE PO, Take 10 mg by mouth daily  (Patient not taking: Reported on 2022 ), Disp: , Rfl:     clonazePAM (KlonoPIN) 0 5 mg tablet, , Disp: , Rfl:     doxycycline (ADOXA) 100 MG tablet, Take 1 tablet (100 mg total) by mouth 2 (two) times a day for 7 days, Disp: 14 tablet, Rfl: 0    SUMAtriptan (IMITREX) 50 mg tablet, TAKE 1 TABLET BY MOUTH BY MOUTH ONCE AS NEEDED FOR MIGRAINE MAY REPEAT IN 2 HOURS IF UNRESVOLED (Patient not taking: Reported on 2/13/2022), Disp: , Rfl:     Current Facility-Administered Medications:     cefTRIAXone (ROCEPHIN) injection 500 mg, 500 mg, Intramuscular, Once, Dayana Cuff, PA-C    lidocaine (XYLOCAINE) 1 % injection 1 mL, 1 mL, Infiltration, Once, Dayana Cuff, PA-C    Current Allergies     Allergies as of 02/13/2022 - Reviewed 02/13/2022   Allergen Reaction Noted    Amoxicillin  07/07/2019    Penicillins  08/19/2017            The following portions of the patient's history were reviewed and updated as appropriate: allergies, current medications, past family history, past medical history, past social history, past surgical history and problem list      History reviewed  No pertinent past medical history  History reviewed  No pertinent surgical history  Family History   Problem Relation Age of Onset    Diabetes Father          Medications have been verified  Objective   /70   Pulse 97   Temp 98 3 °F (36 8 °C)   Resp 18   Ht 5' 9" (1 753 m)   Wt 56 7 kg (125 lb) Comment: stated  SpO2 99%   BMI 18 46 kg/m²   No LMP recorded  Physical Exam     Physical Exam  Constitutional:       Appearance: Normal appearance  HENT:      Head: Normocephalic and atraumatic  Nose: Nose normal       Mouth/Throat:      Mouth: Mucous membranes are moist    Eyes:      Extraocular Movements: Extraocular movements intact  Conjunctiva/sclera: Conjunctivae normal       Pupils: Pupils are equal, round, and reactive to light  Cardiovascular:      Rate and Rhythm: Normal rate  Pulmonary:      Effort: Pulmonary effort is normal    Musculoskeletal:         General: Normal range of motion  Cervical back: Normal range of motion and neck supple  Skin:     General: Skin is warm and dry        Capillary Refill: Capillary refill takes less than 2 seconds  Neurological:      General: No focal deficit present  Mental Status: She is alert and oriented to person, place, and time     Psychiatric:         Mood and Affect: Mood normal          Behavior: Behavior normal

## 2022-02-14 ENCOUNTER — HOSPITAL ENCOUNTER (EMERGENCY)
Facility: HOSPITAL | Age: 32
Discharge: HOME | End: 2022-02-14
Attending: EMERGENCY MEDICINE | Admitting: EMERGENCY MEDICINE
Payer: COMMERCIAL

## 2022-02-14 VITALS
RESPIRATION RATE: 18 BRPM | SYSTOLIC BLOOD PRESSURE: 132 MMHG | TEMPERATURE: 99.1 F | HEART RATE: 77 BPM | OXYGEN SATURATION: 100 % | DIASTOLIC BLOOD PRESSURE: 80 MMHG

## 2022-02-14 DIAGNOSIS — F41.9 ANXIETY: ICD-10-CM

## 2022-02-14 DIAGNOSIS — Z72.820 SLEEP DEPRIVATION: Primary | ICD-10-CM

## 2022-02-14 LAB
C TRACH DNA SPEC QL NAA+PROBE: NEGATIVE
N GONORRHOEA DNA SPEC QL NAA+PROBE: NEGATIVE

## 2022-02-14 PROCEDURE — 99282 EMERGENCY DEPT VISIT SF MDM: CPT | Performed by: EMERGENCY MEDICINE

## 2022-02-14 PROCEDURE — 99283 EMERGENCY DEPT VISIT LOW MDM: CPT

## 2022-02-14 NOTE — Clinical Note
Florencia Rodarte was seen and treated in our emergency department on 2/14/2022  Diagnosis:     Nasra Ochoa  may return to work on return date  She may return on this date: 02/15/2022         If you have any questions or concerns, please don't hesitate to call        Donny Hi, DO    ______________________________           _______________          _______________  Hospital Representative                              Date                                Time

## 2022-02-14 NOTE — ED PROVIDER NOTES
History  Chief Complaint   Patient presents with    Allergic Reaction     Pt to ED with c/o allergic reaction per pt "antibiotic shot yesterday and today I feel disoriented and my leg is sore"     This 18-year-old female feels very tired and somewhat confused over the past 1/2 hour  She is concerned this is reaction to a intramuscular injection of ceftriaxone with xylocaine she received yesterday  She has pain in the leg where the injection was given  She denies dyspnea, pruritus, lightheadedness, abdominal pain, vomiting or diarrhea  She is able to drive herself here  She admits that this may also be more of a panic attack  She denies recent illness or change in medications  Prior to Admission Medications   Prescriptions Last Dose Informant Patient Reported? Taking? CITALOPRAM HYDROBROMIDE PO  Self Yes No   Sig: Take 10 mg by mouth daily    Patient not taking: Reported on 2/13/2022    SUMAtriptan (IMITREX) 50 mg tablet   Yes No   Sig: TAKE 1 TABLET BY MOUTH BY MOUTH ONCE AS NEEDED FOR MIGRAINE MAY REPEAT IN 2 HOURS IF UNRESVOLED   Patient not taking: Reported on 2/13/2022   clonazePAM (KlonoPIN) 0 5 mg tablet  Self Yes No   Patient not taking: Reported on 2/13/2022    doxycycline (ADOXA) 100 MG tablet   No No   Sig: Take 1 tablet (100 mg total) by mouth 2 (two) times a day for 7 days      Facility-Administered Medications Last Administration Doses Remaining   cefTRIAXone (ROCEPHIN) injection 500 mg 2/13/2022  9:44 AM 0   lidocaine (XYLOCAINE) 1 % injection 1 mL 2/13/2022  9:44 AM 0          History reviewed  No pertinent past medical history  History reviewed  No pertinent surgical history  Family History   Problem Relation Age of Onset    Diabetes Father      I have reviewed and agree with the history as documented      E-Cigarette/Vaping    E-Cigarette Use Never User      E-Cigarette/Vaping Substances    Nicotine No     THC No     CBD No     Flavoring No     Other No     Unknown No Social History     Tobacco Use    Smoking status: Current Every Day Smoker     Packs/day: 0 50     Types: Cigarettes    Smokeless tobacco: Never Used   Vaping Use    Vaping Use: Never used   Substance Use Topics    Alcohol use: Not Currently    Drug use: No       Review of Systems   Genitourinary: Positive for vaginal discharge (Recently)  Psychiatric/Behavioral: Positive for dysphoric mood  The patient is nervous/anxious  All other systems reviewed and are negative  Physical Exam  Physical Exam  Vitals and nursing note reviewed  Constitutional:       General: She is not in acute distress  Appearance: She is well-developed and normal weight  She is not ill-appearing or diaphoretic  Comments: Very anxious   HENT:      Head: Normocephalic and atraumatic  Right Ear: External ear normal       Left Ear: External ear normal       Nose: Nose normal       Mouth/Throat:      Mouth: Mucous membranes are moist       Pharynx: Oropharynx is clear  Eyes:      General: No scleral icterus  Conjunctiva/sclera: Conjunctivae normal       Pupils: Pupils are equal, round, and reactive to light  Cardiovascular:      Rate and Rhythm: Normal rate and regular rhythm  Pulses: Normal pulses  Heart sounds: Normal heart sounds  No murmur heard  Pulmonary:      Effort: Pulmonary effort is normal       Breath sounds: Normal breath sounds  No wheezing  Abdominal:      General: Bowel sounds are normal       Palpations: Abdomen is soft  Tenderness: There is no abdominal tenderness  There is no guarding or rebound  Musculoskeletal:         General: No tenderness  Normal range of motion  Cervical back: Normal range of motion and neck supple  No tenderness  Right lower leg: No edema  Left lower leg: No edema  Skin:     General: Skin is warm and dry  Capillary Refill: Capillary refill takes less than 2 seconds  Findings: No rash     Neurological:      General: No focal deficit present  Mental Status: She is alert and oriented to person, place, and time  Mental status is at baseline  Cranial Nerves: No cranial nerve deficit  Sensory: No sensory deficit  Motor: No weakness  Coordination: Coordination normal       Gait: Gait normal       Deep Tendon Reflexes: Reflexes are normal and symmetric  Psychiatric:         Behavior: Behavior normal          Vital Signs  ED Triage Vitals [02/14/22 0554]   Temperature Pulse Respirations Blood Pressure SpO2   99 1 °F (37 3 °C) 77 18 132/80 100 %      Temp src Heart Rate Source Patient Position - Orthostatic VS BP Location FiO2 (%)   -- -- -- -- --      Pain Score       --           Vitals:    02/14/22 0554   BP: 132/80   Pulse: 77         Visual Acuity      ED Medications  Medications - No data to display    Diagnostic Studies  Results Reviewed     None                 No orders to display              Procedures  Procedures         ED Course  ED Course as of 02/14/22 0646   Mon Feb 14, 2022   3418 Patient slept a bit and feels much better now  Admits that she did not get much sleep last night  Vital signs are stable and she has no urticaria, wheezing or edema  SBIRT 20yo+      Most Recent Value   SBIRT (22 yo +)    In order to provide better care to our patients, we are screening all of our patients for alcohol and drug use  Would it be okay to ask you these screening questions? Yes Filed at: 02/14/2022 0555   Initial Alcohol Screen: US AUDIT-C     1  How often do you have a drink containing alcohol? 0 Filed at: 02/14/2022 0555   2  How many drinks containing alcohol do you have on a typical day you are drinking? 0 Filed at: 02/14/2022 0555   3a  Male UNDER 65: How often do you have five or more drinks on one occasion? 0 Filed at: 02/14/2022 0555   3b  FEMALE Any Age, or MALE 65+: How often do you have 4 or more drinks on one occassion?  0 Filed at: 02/14/2022 0555   Audit-C Score 0 Filed at: 02/14/2022 7692   BRENNAN: How many times in the past year have you    Used an illegal drug or used a prescription medication for non-medical reasons? Never Filed at: 02/14/2022 0555                    [facility]  Number of Diagnoses or Management Options  Anxiety  Sleep deprivation  Diagnosis management comments: 34-year-old female with symptoms of anxiety  She complains of generalized weakness and confusion although was able to drive here  She admits this may be anxiety  She has sore area of the left leg where she got the injection yesterday but has no signs of allergic reaction  Will observe for brief period time  After sleeping patient feels much better  She now admits that she did not get much sleep at all last night  She was in the emergency room many hours because her daughter had an injury  Amount and/or Complexity of Data Reviewed  Review and summarize past medical records: yes        Disposition  Final diagnoses:   Sleep deprivation   Anxiety     Time reflects when diagnosis was documented in both MDM as applicable and the Disposition within this note     Time User Action Codes Description Comment    2/14/2022  6:44 AM Arnulfo Villafuerte Add [B05 308] Sleep deprivation     2/14/2022  6:44 AM Arnulfo Villafuerte Add [F41 9] Anxiety       ED Disposition     ED Disposition Condition Date/Time Comment    Discharge Stable Mon Feb 14, 2022  6:43 AM Melissa Javed discharge to home/self care  Follow-up Information     Follow up With Specialties Details Why Contact Info    [employer]  Call  As needed, to find a local physician [phone]            Patient's Medications   Discharge Prescriptions    No medications on file       No discharge procedures on file      PDMP Review     None          ED Provider  Electronically Signed by           Anjel Kruse DO  02/14/22 5986

## 2022-06-16 ENCOUNTER — TELEPHONE (OUTPATIENT)
Dept: FAMILY MEDICINE CLINIC | Facility: CLINIC | Age: 32
End: 2022-06-16

## 2022-06-16 NOTE — TELEPHONE ENCOUNTER
Patient scheduled herself on my chart for 15 minutes with you today (last apt of the day)  When I called she said she is very depressed was upset on phone  I asked patient if having thoughts of hurting herself and she stated "thoughts are there, but knows she has to stay strong because of her kids"  She would like to go on medication she said, but I know 15 minutes is not enough time        How would you like me to handle this apt?

## 2022-06-16 NOTE — TELEPHONE ENCOUNTER
Spoke to patient and patient stated she does have a family doctor with [facility]  She is going to call her family doctor  I also mentioned to patient she could go to ER and talk to someone, but did explain she really needs to follow up with her family doctor because they know her best at [facility]

## 2022-06-24 ENCOUNTER — APPOINTMENT (OUTPATIENT)
Dept: RADIOLOGY | Facility: CLINIC | Age: 32
End: 2022-06-24
Payer: COMMERCIAL

## 2022-06-24 ENCOUNTER — OCCMED (OUTPATIENT)
Dept: URGENT CARE | Facility: CLINIC | Age: 32
End: 2022-06-24
Payer: OTHER MISCELLANEOUS

## 2022-06-24 DIAGNOSIS — M25.512 ACUTE PAIN OF LEFT SHOULDER: ICD-10-CM

## 2022-06-24 DIAGNOSIS — M25.512 ACUTE PAIN OF LEFT SHOULDER: Primary | ICD-10-CM

## 2022-06-24 PROCEDURE — 99213 OFFICE O/P EST LOW 20 MIN: CPT

## 2022-06-24 PROCEDURE — 73030 X-RAY EXAM OF SHOULDER: CPT

## 2022-06-24 NOTE — PROGRESS NOTES
This encounter was created for [facility] orders only   [address] [facility]        NAME: Roxie Olivera is a 27 y o  female  : 1990    MRN: [id number]  DATE: 2022  TIME: 4:45 PM    There were no vitals taken for this visit  Assessment and Plan   Acute pain of left shoulder [M25 512]  1  Acute pain of left shoulder  XR shoulder 2+ vw left         Patient Instructions       Follow up with PCP in 3-5 days  Proceed to  ER if symptoms worsen  Chief Complaint   No chief complaint on file  History of Present Illness       HPI    Review of Systems   Review of Systems      Current Medications       Current Outpatient Medications:     CITALOPRAM HYDROBROMIDE PO, Take 10 mg by mouth daily  (Patient not taking: Reported on 2022 ), Disp: , Rfl:     clonazePAM (KlonoPIN) 0 5 mg tablet, , Disp: , Rfl:     SUMAtriptan (IMITREX) 50 mg tablet, TAKE 1 TABLET BY MOUTH BY MOUTH ONCE AS NEEDED FOR MIGRAINE MAY REPEAT IN 2 HOURS IF Chris Grice (Patient not taking: Reported on 2022), Disp: , Rfl:     Current Allergies     Allergies as of 2022 - Reviewed 2022   Allergen Reaction Noted    Amoxicillin  2019    Penicillins  2017            The following portions of the patient's history were reviewed and updated as appropriate: allergies, current medications, past family history, past medical history, past social history, past surgical history and problem list      No past medical history on file  No past surgical history on file  Family History   Problem Relation Age of Onset    Diabetes Father          Medications have been verified  Objective   There were no vitals taken for this visit         Physical Exam     Physical Exam

## 2022-06-27 ENCOUNTER — APPOINTMENT (OUTPATIENT)
Dept: URGENT CARE | Facility: CLINIC | Age: 32
End: 2022-06-27
Payer: OTHER MISCELLANEOUS

## 2022-06-27 PROCEDURE — 99213 OFFICE O/P EST LOW 20 MIN: CPT | Performed by: PHYSICIAN ASSISTANT

## 2022-07-05 ENCOUNTER — APPOINTMENT (OUTPATIENT)
Dept: URGENT CARE | Facility: CLINIC | Age: 32
End: 2022-07-05
Payer: OTHER MISCELLANEOUS

## 2022-07-05 PROCEDURE — 99213 OFFICE O/P EST LOW 20 MIN: CPT | Performed by: FAMILY MEDICINE

## 2023-11-14 ENCOUNTER — OFFICE VISIT (OUTPATIENT)
Age: 33
End: 2023-11-14
Payer: COMMERCIAL

## 2023-11-14 VITALS
BODY MASS INDEX: 18.51 KG/M2 | DIASTOLIC BLOOD PRESSURE: 78 MMHG | OXYGEN SATURATION: 97 % | SYSTOLIC BLOOD PRESSURE: 128 MMHG | HEART RATE: 75 BPM | WEIGHT: 125 LBS | HEIGHT: 69 IN

## 2023-11-14 DIAGNOSIS — S29.019A THORACIC MYOFASCIAL STRAIN, INITIAL ENCOUNTER: Primary | ICD-10-CM

## 2023-11-14 DIAGNOSIS — M99.03 SEGMENTAL DYSFUNCTION OF LUMBAR REGION: ICD-10-CM

## 2023-11-14 DIAGNOSIS — M99.01 SEGMENTAL DYSFUNCTION OF CERVICAL REGION: ICD-10-CM

## 2023-11-14 PROCEDURE — 99202 OFFICE O/P NEW SF 15 MIN: CPT | Performed by: CHIROPRACTOR

## 2023-11-14 PROCEDURE — 98941 CHIROPRACT MANJ 3-4 REGIONS: CPT | Performed by: CHIROPRACTOR

## 2023-11-14 NOTE — PROGRESS NOTES
Initial date of service: 11/14/23    Diagnoses and all orders for this visit:    Thoracic myofascial strain, initial encounter    Segmental dysfunction of cervical region    Segmental dysfunction of lumbar region    No red flags, radiculopathy or neurologic deficit appreciated clinically. Pt's symptoms and exam findings consistent with mechanical neck/back pain secondary to repetitive st/sp injury, exacerbated by postural/ergonomic stressors; fleeting BUE N/T likely inflammatory in nature. Pt responded well to traction, stretches and manual mobilization of the affected spinal and myofascial jt dysfunction, with increased ROM; trial of conservative tx recommended consisting of stretching, ther-ex, graded mobilization/manipulation of the affected spinal/myofascial tissues, postural/ergonomic education, and take home stretches/exercises. If symptoms fail to improve with short trial of conservative care, appropriate imaging and referral will be coordinated. Spent greater than 30 min c pt discussing hx, pe, ddx, tx options and reviewing notes/imaging    TREATMENT: 86303  Fear avoidance behavior discussion, encouraged and reassured pt that natural course of condition is to improve over time with adherence to tx plan and home care strategies. Home care recommendations: avoid bed rest, walk (but avoid trails and uneven surfaces), gradual return to activity to tolerance (avoid anything that peripheralizes symptoms), ice 20 min on/off, watch for ice burn, call if symptoms peripheralize, worsen, or neurologic deficit progresses. Ther-ex: IASTM - discussed post procedure soreness and/or ecchymosis for up to 36 hrs, applied to affected mm hypertonicities; wall isa, axial retraction, upper trap stretch, lev scap stretch, erector spinae stretch, abdominal bracing;  Thoracic mobilization/manipulation: prone P-A mob, supine A-P manip; cervical mobilization/manipulation: traction, diversified supine graded mobilization; prone lumbar flexion-traction, side laying graded mobs    HPI:  Tejal Vides is a 28 y.o. female   Chief Complaint   Patient presents with    Left Arm - Numbness    Right Arm - Numbness    Neck Pain     Neck pain-5    Back Pain     Lower back pain-5     Pt presents for eval and tx for neck/ubp with radiation of pain/paresthesia. Pt works for [employer]; does repetitively bending/lifting/twisting. Pt reports Oct 2023 she bent over and felt she "pulled her neck and upper back" with momentary N/T into BUE but peripheral symptoms abated after a few minutes; since then sporadic neck/back pain dependant on what she does at work    Neck Pain   This is a new problem. The current episode started more than 1 month ago. The problem occurs every several days. The problem has been waxing and waning. The pain is present in the midline, left side and right side. The quality of the pain is described as aching and stabbing. Pain scale: 0-7/10. The symptoms are aggravated by stress, twisting, position and bending. The pain is Worse during the day. Pertinent negatives include no headaches. Back Pain  Pertinent negatives include no headaches. History reviewed. No pertinent past medical history. The following portions of the patient's history were reviewed and updated as appropriate: allergies, past family history, past medical history, past social history, past surgical history, and problem list.  Review of Systems   Musculoskeletal:  Positive for back pain and neck pain. Neurological:  Negative for headaches. Physical Exam  Eyes:      Extraocular Movements: Extraocular movements intact. Neck:        Comments: Pnful and limited in Fl (repetitive reps does not peripheralize, nor with ext), [place], [place]  Cardiovascular:      Pulses: Normal pulses. Musculoskeletal:      Cervical back: Pain with movement and muscular tenderness present. Decreased range of motion. Thoracic back: Spasms and tenderness present.  Decreased range of motion. Back:       Comments: Pnful and limited in Ext, Ext/Brot   Lymphadenopathy:      Cervical: No cervical adenopathy. Neurological:      Mental Status: She is alert and oriented to person, place, and time. Cranial Nerves: Cranial nerves 2-12 are intact. Sensory: Sensation is intact. Motor: Motor function is intact. Coordination: Coordination is intact. Gait: Gait is intact. Gait and tandem walk normal.      Deep Tendon Reflexes: Reflexes normal. Babinski sign absent on the right side. Babinski sign absent on the left side. Reflex Scores:       Tricep reflexes are 2+ on the right side and 2+ on the left side. Bicep reflexes are 2+ on the right side and 2+ on the left side. Brachioradialis reflexes are 2+ on the right side and 2+ on the left side. Psychiatric:         Mood and Affect: Mood and affect normal.       SOFT TISSUE ASSESSMENT: Hypertonicity and tenderness palpated B C5-T4, L3-5 erector spinae, upper traps, lev scap, scalene, rhomboidJOINT RECTRICTIONS: C5-T4, L3-5 ORTHO: [place] unremarkable for centralization/peripheralization; max foraminal comp elicits local np R/L; shoulder depression elicits stiffness in R/L upper trap; brachial plexus tension test elicits no neural tension in R/L UE; cervical distraction unremarkable; hoffmans neg B; sitting root neg B; slump test neg B  Return in about 1 week (around 11/21/2023) for Next scheduled follow up.

## 2023-12-05 ENCOUNTER — HOSPITAL ENCOUNTER (EMERGENCY)
Facility: HOSPITAL | Age: 33
Discharge: HOME | End: 2023-12-05
Attending: EMERGENCY MEDICINE
Payer: COMMERCIAL

## 2023-12-05 VITALS
TEMPERATURE: 98.6 F | RESPIRATION RATE: 16 BRPM | DIASTOLIC BLOOD PRESSURE: 74 MMHG | HEART RATE: 95 BPM | SYSTOLIC BLOOD PRESSURE: 112 MMHG | OXYGEN SATURATION: 100 %

## 2023-12-05 DIAGNOSIS — K04.7 DENTAL INFECTION: Primary | ICD-10-CM

## 2023-12-05 PROCEDURE — 96372 THER/PROPH/DIAG INJ SC/IM: CPT

## 2023-12-05 PROCEDURE — 99282 EMERGENCY DEPT VISIT SF MDM: CPT

## 2023-12-05 PROCEDURE — 99284 EMERGENCY DEPT VISIT MOD MDM: CPT | Performed by: EMERGENCY MEDICINE

## 2023-12-05 RX ORDER — OXYCODONE HYDROCHLORIDE AND ACETAMINOPHEN 5; 325 MG/1; MG/1
1 TABLET ORAL EVERY 4 HOURS PRN
Qty: 5 TABLET | Refills: 0 | Status: SHIPPED | OUTPATIENT
Start: 2023-12-05 | End: 2023-12-15

## 2023-12-05 RX ORDER — CHLORHEXIDINE GLUCONATE ORAL RINSE 1.2 MG/ML
15 SOLUTION DENTAL 2 TIMES DAILY
Qty: 120 ML | Refills: 0 | Status: SHIPPED | OUTPATIENT
Start: 2023-12-05

## 2023-12-05 RX ORDER — CLINDAMYCIN HYDROCHLORIDE 150 MG/1
450 CAPSULE ORAL ONCE
Status: COMPLETED | OUTPATIENT
Start: 2023-12-05 | End: 2023-12-05

## 2023-12-05 RX ORDER — CLINDAMYCIN HYDROCHLORIDE 150 MG/1
450 CAPSULE ORAL 3 TIMES DAILY
Qty: 90 CAPSULE | Refills: 0 | Status: SHIPPED | OUTPATIENT
Start: 2023-12-05 | End: 2023-12-15

## 2023-12-05 RX ORDER — KETOROLAC TROMETHAMINE 30 MG/ML
30 INJECTION, SOLUTION INTRAMUSCULAR; INTRAVENOUS ONCE
Status: COMPLETED | OUTPATIENT
Start: 2023-12-05 | End: 2023-12-05

## 2023-12-05 RX ADMIN — KETOROLAC TROMETHAMINE 30 MG: 30 INJECTION, SOLUTION INTRAMUSCULAR; INTRAVENOUS at 21:34

## 2023-12-05 RX ADMIN — CLINDAMYCIN HYDROCHLORIDE 450 MG: 150 CAPSULE ORAL at 21:34

## 2023-12-06 NOTE — ED PROVIDER NOTES
History  Chief Complaint   Patient presents with    Dental Pain     Patient reports a filling fell out a few weeks ago. Patient now has tooth pain, bottom, right side of mouth. Patient reports feeling febrile but has not taken her temp. She taken motrin. Last dose at approximately 1500.     29 yo F with no significant PMH presents to ED with left lower dental pain. Lost a filling a few weeks ago, has been bothering her ever since. Has a dental appt in about 1.5 months to have tooth pulled. Has been more painful over the past few days. No new injury. No fevers/chills. Smokes tobacco. 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 No difficulty breathing or swallowing. No change in voice. Tried motrin w/out much relief. Pt denies pregnancy. History provided by:  Patient and medical records   used: No    Dental Pain  Location:  Lower  Lower teeth location:  18/LL 2nd molar  Quality:  Dull  Severity:  Severe  Onset quality:  Gradual  Duration:  2 days  Timing:  Constant  Progression:  Unchanged  Chronicity:  New  Context: cap fell off    Relieved by:  Nothing  Worsened by:  Touching and pressure  Ineffective treatments:  NSAIDs  Associated symptoms: no congestion, no difficulty swallowing, no drooling, no facial pain, no facial swelling, no fever, no gum swelling, no headaches, no neck pain, no neck swelling, no oral bleeding, no oral lesions and no trismus    Risk factors: smoking        Prior to Admission Medications   Prescriptions Last Dose Informant Patient Reported? Taking?    CITALOPRAM HYDROBROMIDE PO  Self Yes No   Sig: Take 10 mg by mouth daily   Patient not taking: Reported on 11/14/2023   SUMAtriptan (IMITREX) 50 mg tablet   Yes No   Sig: TAKE 1 TABLET BY MOUTH BY MOUTH ONCE AS NEEDED FOR MIGRAINE MAY REPEAT IN 2 HOURS IF UNRESVOLED   Patient not taking: Reported on 11/14/2023   clonazePAM (KlonoPIN) 0.5 mg tablet  Self Yes No   Patient not taking: Reported on 11/14/2023      Facility-Administered Medications: None       No past medical history on file. No past surgical history on file. Family History   Problem Relation Age of Onset    Diabetes Father      I have reviewed and agree with the history as documented. E-Cigarette/Vaping    E-Cigarette Use Never User      E-Cigarette/Vaping Substances    Nicotine No     THC No     CBD No     Flavoring No     Other No     Unknown No      Social History     Tobacco Use    Smoking status: Every Day     Packs/day: 0.50     Types: Cigarettes    Smokeless tobacco: Never   Vaping Use    Vaping Use: Never used   Substance Use Topics    Alcohol use: Not Currently    Drug use: No       Review of Systems   Constitutional:  Negative for appetite change, chills, fatigue and fever. HENT:  Positive for dental problem. Negative for congestion, drooling, ear pain, facial swelling, mouth sores, rhinorrhea, sore throat, trouble swallowing and voice change. Eyes:  Negative for pain and visual disturbance. Respiratory:  Negative for cough, chest tightness and shortness of breath. Cardiovascular:  Negative for chest pain, palpitations and leg swelling. Gastrointestinal:  Negative for abdominal pain, blood in stool, constipation, diarrhea, nausea and vomiting. Genitourinary:  Negative for difficulty urinating and hematuria. Musculoskeletal:  Negative for back pain, neck pain and neck stiffness. Skin:  Negative for rash. Neurological:  Negative for dizziness, syncope, speech difficulty, light-headedness and headaches. Psychiatric/Behavioral:  Negative for confusion and suicidal ideas. Physical Exam  Physical Exam  Vitals and nursing note reviewed. Constitutional:       General: She is not in acute distress. Appearance: Normal appearance. She is well-developed. She is not ill-appearing, toxic-appearing or diaphoretic. HENT:      Head: Normocephalic and atraumatic. Jaw: There is normal jaw occlusion. Tenderness present. No trismus, swelling, pain on movement or malocclusion. Comments: Mild left jaw TTP overlying dental pain. No erythema or fluctuance. Right Ear: External ear normal.      Left Ear: External ear normal.      Nose: Nose normal.      Mouth/Throat:      Mouth: Mucous membranes are moist. No lacerations or angioedema. Dentition: Dental tenderness and dental caries present. Pharynx: Oropharynx is clear. Uvula midline. No pharyngeal swelling, oropharyngeal exudate, posterior oropharyngeal erythema or uvula swelling. Comments: Poor dentition. Tender as noted, with mild gum swelling/erythema. No abscess. No trismus/ludwigs. Eyes:      General: No scleral icterus. Right eye: No discharge. Left eye: No discharge. Extraocular Movements: Extraocular movements intact. Conjunctiva/sclera: Conjunctivae normal.      Pupils: Pupils are equal, round, and reactive to light. Neck:      Trachea: No tracheal deviation. Cardiovascular:      Rate and Rhythm: Normal rate and regular rhythm. Pulses: Normal pulses. Heart sounds: Normal heart sounds. No murmur heard. No friction rub. No gallop. Pulmonary:      Effort: Pulmonary effort is normal. No respiratory distress. Breath sounds: Normal breath sounds. No stridor. Chest:      Chest wall: No tenderness. Abdominal:      General: Bowel sounds are normal.      Palpations: Abdomen is soft. Tenderness: There is no abdominal tenderness. There is no right CVA tenderness, left CVA tenderness, guarding or rebound. Musculoskeletal:         General: No tenderness or deformity. Normal range of motion. Cervical back: Normal range of motion and neck supple. No tenderness. Right lower leg: No edema. Left lower leg: No edema. Lymphadenopathy:      Cervical: No cervical adenopathy. Skin:     General: Skin is warm and dry. Findings: No bruising or rash.    Neurological: General: No focal deficit present. Mental Status: She is alert and oriented to person, place, and time. Cranial Nerves: No cranial nerve deficit. Sensory: No sensory deficit. Motor: No weakness. Coordination: Coordination normal.   Psychiatric:         Mood and Affect: Mood normal.         Behavior: Behavior normal.         Vital Signs  ED Triage Vitals   Temperature Pulse Respirations Blood Pressure SpO2   12/05/23 2110 12/05/23 2112 12/05/23 2112 12/05/23 2112 12/05/23 2112   98.6 °F (37 °C) 95 16 112/74 100 %      Temp Source Heart Rate Source Patient Position - Orthostatic VS BP Location FiO2 (%)   12/05/23 2110 12/05/23 2112 12/05/23 2112 12/05/23 2112 --   Temporal Monitor Sitting Left arm       Pain Score       12/05/23 2134       10 - Worst Possible Pain           Vitals:    12/05/23 2112   BP: 112/74   Pulse: 95   Patient Position - Orthostatic VS: Sitting         Visual Acuity      ED Medications  Medications   ketorolac (TORADOL) injection 30 mg (30 mg Intramuscular Given 12/5/23 2134)   clindamycin (CLEOCIN) capsule 450 mg (450 mg Oral Given 12/5/23 2134)       Diagnostic Studies  Results Reviewed       None                   No orders to display              Procedures  Procedures         ED Course                                             Medical Decision Making  Pain control, abx for likely dental infection. She already has dental appt scheduled. Discussed supportive care and RTED precautions. All questions answered. Problems Addressed:  Dental infection: acute illness or injury    Amount and/or Complexity of Data Reviewed  External Data Reviewed: notes. Risk  Prescription drug management.              Disposition  Final diagnoses:   Dental infection     Time reflects when diagnosis was documented in both MDM as applicable and the Disposition within this note       Time User Action Codes Description Comment    12/5/2023  9:29 PM Reena Robertson Add [K04.7] Dental infection           ED Disposition       ED Disposition   Discharge    Condition   Stable    Date/Time   Tue Dec 5, 2023  9:30 PM    Comment   Abdelrahman Gagnon discharge to home/self care. Follow-up Information       Follow up With Specialties Details Why Contact Info Additional Information     [address] [facility] Emergency Department Emergency Medicine  If symptoms worsen [address] [facility] [id number]  [address] [facility] Emergency Department, [address] [facility] [place], [address] [facility]    Follow up with your dentist as scheduled. Patient's Medications   Discharge Prescriptions    CHLORHEXIDINE (PERIDEX) 0.12 % SOLUTION    Apply 15 mL to the mouth or throat 2 (two) times a day       Start Date: 12/5/2023 End Date: --       Order Dose: 15 mL       Quantity: 120 mL    Refills: 0    CLINDAMYCIN (CLEOCIN) 150 MG CAPSULE    Take 3 capsules (450 mg total) by mouth 3 (three) times a day for 10 days       Start Date: 12/5/2023 End Date: 12/15/2023       Order Dose: 450 mg       Quantity: 90 capsule    Refills: 0    OXYCODONE-ACETAMINOPHEN (PERCOCET) 5-325 MG PER TABLET    Take 1 tablet by mouth every 4 (four) hours as needed for moderate pain for up to 10 days Max Daily Amount: 6 tablets       Start Date: 12/5/2023 End Date: 12/15/2023       Order Dose: 1 tablet       Quantity: 5 tablet    Refills: 0       No discharge procedures on file.     PDMP Review         Value Time User    PDMP Reviewed  Yes 12/5/2023  9:28 PM Ana Frazier MD            ED Provider  Electronically Signed by             Ana Frazier MD  12/05/23 4575

## 2024-01-29 ENCOUNTER — OFFICE VISIT (OUTPATIENT)
Dept: URGENT CARE | Facility: CLINIC | Age: 34
End: 2024-01-29
Payer: COMMERCIAL

## 2024-01-29 VITALS
RESPIRATION RATE: 16 BRPM | TEMPERATURE: 98.1 F | HEART RATE: 84 BPM | OXYGEN SATURATION: 96 % | DIASTOLIC BLOOD PRESSURE: 63 MMHG | SYSTOLIC BLOOD PRESSURE: 108 MMHG

## 2024-01-29 DIAGNOSIS — K04.7 DENTAL INFECTION: Primary | ICD-10-CM

## 2024-01-29 PROCEDURE — 99213 OFFICE O/P EST LOW 20 MIN: CPT | Performed by: PHYSICIAN ASSISTANT

## 2024-01-29 RX ORDER — CLINDAMYCIN HYDROCHLORIDE 300 MG/1
300 CAPSULE ORAL 4 TIMES DAILY
Qty: 40 CAPSULE | Refills: 0 | Status: SHIPPED | OUTPATIENT
Start: 2024-01-29 | End: 2024-02-08

## 2024-01-29 NOTE — PATIENT INSTRUCTIONS
Follow-up with your dentist in the next 3-5 days.  Any new or worsening symptoms develop get re-evaluated sooner or proceed to the ER.  Take antibiotics as prescribed.

## 2024-01-29 NOTE — PROGRESS NOTES
[facility]        NAME: Thea Fay is a 33 y.o. female  : 1990    MRN: [id number]  DATE: 2024  TIME: 10:03 AM    Assessment and Plan   Dental infection [K04.7]  1. Dental infection  clindamycin (CLEOCIN) 300 MG capsule            Patient Instructions       Follow up with PCP in 3-5 days.  Proceed to  ER if symptoms worsen.    Chief Complaint     Chief Complaint   Patient presents with    Dental Pain     Pt reports left-sided mouth pain. Has an abscess on a tooth she needs to get worked on.          History of Present Illness       Patient presents for left lower molar dental abscess that has been reoccurring.  She states she has an appointment to get it pulled in a few weeks but cannot see the dentist until then and they will see her if there is an infection going on.  Denies fevers, chest pains, palpitations, shortness of breath.    Dental Pain   Pertinent negatives include no fever.       Review of Systems   Review of Systems   Constitutional:  Negative for fatigue and fever.   HENT:  Positive for dental problem.    Cardiovascular:  Negative for chest pain and palpitations.         Current Medications       Current Outpatient Medications:     clindamycin (CLEOCIN) 300 MG capsule, Take 1 capsule (300 mg total) by mouth 4 (four) times a day for 10 days, Disp: 40 capsule, Rfl: 0    chlorhexidine (PERIDEX) 0.12 % solution, Apply 15 mL to the mouth or throat 2 (two) times a day, Disp: 120 mL, Rfl: 0    CITALOPRAM HYDROBROMIDE PO, Take 10 mg by mouth daily (Patient not taking: Reported on 2023), Disp: , Rfl:     clonazePAM (KlonoPIN) 0.5 mg tablet, , Disp: , Rfl:     SUMAtriptan (IMITREX) 50 mg tablet, TAKE 1 TABLET BY MOUTH BY MOUTH ONCE AS NEEDED FOR MIGRAINE MAY REPEAT IN 2 HOURS IF UNRESVOLED (Patient not taking: Reported on 2023), Disp: , Rfl:     Current Allergies     Allergies as of 2024 - Reviewed 2024   Allergen Reaction Noted    Amoxicillin   07/07/2019    Penicillins  08/19/2017            The following portions of the patient's history were reviewed and updated as appropriate: allergies, current medications, past family history, past medical history, past social history, past surgical history and problem list.     History reviewed. No pertinent past medical history.    History reviewed. No pertinent surgical history.    Family History   Problem Relation Age of Onset    Diabetes Father          Medications have been verified.        Objective   /63   Pulse 84   Temp 98.1 °F (36.7 °C)   Resp 16   SpO2 96%   No LMP recorded.       Physical Exam     Physical Exam  Constitutional:       Appearance: Normal appearance.   HENT:      Head: Normocephalic and atraumatic.      Nose: Nose normal.      Mouth/Throat:      Mouth: Mucous membranes are moist.     Lymphadenopathy:      Cervical: No cervical adenopathy.   Neurological:      Mental Status: She is alert.   Psychiatric:         Mood and Affect: Mood normal.         Behavior: Behavior normal.

## 2024-11-03 ENCOUNTER — HOSPITAL ENCOUNTER (EMERGENCY)
Facility: HOSPITAL | Age: 34
Discharge: HOME | End: 2024-11-03
Attending: EMERGENCY MEDICINE
Payer: OTHER MISCELLANEOUS

## 2024-11-03 VITALS
TEMPERATURE: 98.5 F | OXYGEN SATURATION: 99 % | DIASTOLIC BLOOD PRESSURE: 69 MMHG | HEART RATE: 74 BPM | SYSTOLIC BLOOD PRESSURE: 116 MMHG | RESPIRATION RATE: 17 BRPM
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
## 2024-11-03 DIAGNOSIS — S00.83XA CONTUSION OF FOREHEAD, INITIAL ENCOUNTER: ICD-10-CM

## 2024-11-03 DIAGNOSIS — S80.02XA CONTUSION OF LEFT KNEE, INITIAL ENCOUNTER: ICD-10-CM

## 2024-11-03 DIAGNOSIS — S00.81XA ABRASION OF FOREHEAD, INITIAL ENCOUNTER: ICD-10-CM

## 2024-11-03 LAB
ATRIAL RATE: 74 BPM
P AXIS: 60 DEGREES
PR INTERVAL: 182 MS
QRS AXIS: 70 DEGREES
QRSD INTERVAL: 88 MS
QT INTERVAL: 382 MS
QTC INTERVAL: 424 MS
T WAVE AXIS: 57 DEGREES
VENTRICULAR RATE: 74 BPM

## 2024-11-03 PROCEDURE — 99284 EMERGENCY DEPT VISIT MOD MDM: CPT | Performed by: EMERGENCY MEDICINE

## 2024-11-03 PROCEDURE — 93010 ELECTROCARDIOGRAM REPORT: CPT | Performed by: INTERNAL MEDICINE

## 2024-11-03 PROCEDURE — 93005 ELECTROCARDIOGRAM TRACING: CPT

## 2024-11-03 PROCEDURE — 99283 EMERGENCY DEPT VISIT LOW MDM: CPT
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
## 2024-11-03 NOTE — Clinical Note
Thea Fay was seen and treated in our emergency department on 11/3/2024.    No restrictions            Diagnosis:     Thea  may return to work on return date.    She may return on this date: 11/04/2024         If you have any questions or concerns, please don't hesitate to call.      Sadiq Coronado, DO    ______________________________           _______________          _______________  Hospital Representative                              Date                                Time
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
## 2024-11-07 ENCOUNTER — ULTRASOUND (OUTPATIENT)
Dept: OBGYN CLINIC | Facility: CLINIC | Age: 34
End: 2024-11-07
Payer: COMMERCIAL

## 2024-11-07 VITALS
SYSTOLIC BLOOD PRESSURE: 114 MMHG | DIASTOLIC BLOOD PRESSURE: 70 MMHG | BODY MASS INDEX: 20.88 KG/M2 | HEIGHT: 69 IN | WEIGHT: 141 LBS

## 2024-11-07 DIAGNOSIS — N91.2 AMENORRHEA: Primary | ICD-10-CM

## 2024-11-07 DIAGNOSIS — Z3A.09 9 WEEKS GESTATION OF PREGNANCY: ICD-10-CM

## 2024-11-07 DIAGNOSIS — O09.521 MULTIGRAVIDA OF ADVANCED MATERNAL AGE IN FIRST TRIMESTER: ICD-10-CM

## 2024-11-07 PROCEDURE — 99203 OFFICE O/P NEW LOW 30 MIN: CPT | Performed by: STUDENT IN AN ORGANIZED HEALTH CARE EDUCATION/TRAINING PROGRAM

## 2024-11-07 PROCEDURE — 76817 TRANSVAGINAL US OBSTETRIC: CPT | Performed by: STUDENT IN AN ORGANIZED HEALTH CARE EDUCATION/TRAINING PROGRAM

## 2024-11-07 NOTE — PROGRESS NOTES
[facility] - [address], [address], [place] [address]  Assessment & Plan  Amenorrhea  - Single live intrauterine pregnancy identified on US today. Will assign Estimated Date of Delivery: 25 based on LMP.  - Prenatal vitamin with folic acid recommended.  - Medication list reviewed. Discussed medications that should be discontinued.   - Ultrasound completed, please see Chart Review - Ob Procedures, Ultrasound Report for Interpretation.  - Briefly reviewed option for aneuploidy screening. Patient referred to [facility] for first trimester consultation.  - Will schedule nursing visit for prenatal intake.   Orders:    AMB  OB < 14 weeks single or first gestation level 1    Subjective:   CC:  Missed period  Thea Fay is a 34 y.o.  adult who presents for missed period, now with confirmed viable pregnancy. She is a new patient.     Patient reports that she was not using contraception at the time of conception. She reports she is certain of her LMP and that she has regular menses, approximately q28 days. She has had no vaginal bleeding since her LMP.    ROS: No leakage of fluid, pelvic pain, or vaginal bleeding.         Past Medical History:   Diagnosis Date    Depression     Substance abuse (HCC)      History reviewed. No pertinent surgical history.  Family History   Problem Relation Age of Onset    Diabetes Father      Social History     Socioeconomic History    Marital status: Single     Spouse name: None    Number of children: None    Years of education: None    Highest education level: None   Occupational History    None   Tobacco Use    Smoking status: Former     Current packs/day: 0.00     Average packs/day: 0.5 packs/day for 10.0 years (5.0 ttl pk-yrs)     Types: Cigarettes     Start date: 2014     Quit date: 2024     Years since quittin.1    Smokeless tobacco: Current   Vaping Use    Vaping status: Never Used   Substance and Sexual Activity    Alcohol use: Never     Drug use: Not Currently     Types: Methamphetamines    Sexual activity: Yes     Partners: Male     Birth control/protection: None   Other Topics Concern    None   Social History Narrative    None     Social Determinants of Health     Financial Resource Strain: Not on file   Food Insecurity: Not on file   Transportation Needs: Not on file   Physical Activity: Not on file   Stress: Not on file   Social Connections: Unknown (6/18/2024)    Received from [employer]     How often do you feel lonely or isolated from those around you? (Adult - for ages 18 years and over): Not on file   Intimate Partner Violence: Not on file   Housing Stability: Not on file     Outpatient Medications Marked as Taking for the 11/7/24 encounter (Ultrasound) with Sid Perez MD   Medication    Prenatal MV-Min-Fe Fum-FA-DHA (PRENATAL 1 PO)     Allergies   Allergen Reactions    Amoxicillin     Penicillins        FIRST TRIMESTER OBSTETRIC ULTRASOUND  Date of study: 11/7/2024  Performed by: Sid Perez MD     INDICATION: Amenorrhea, viability    COMPARISON: None.     TECHNIQUE:   Transvaginal imaging was performed to assess the gestation, myometrial/endometrial architecture and ovarian parenchymal detail.    The study includes volumetric sweeps and traditional still imaging technique.      FINDINGS:     A single intrauterine gestation is identified.  Cardiac activity is detected at 166 bpm.      YOLK SAC:  Present and normal in size and appearance.  MEAN CROWN RUMP LENGTH:  29.6 mm = 9 weeks 5 days   AMNIOTIC FLUID/SAC SHAPE:  Within expected normal range.     UTERUS/ADNEXA:   No adnexal mass or pathologic cyst.  No free fluid identified.     IMPRESSION:    Patient's last menstrual period was 09/02/2024.  Gestational age based on LMP: 9w3d  Gestational age based on US: 9w5d    Will assign dating based on LMP  Final Gestational age: 9w3d  Final Estimated Date of Delivery: 6/9/25   Fetal cardiac activity detected.  No  "adnexal masses seen.    Sid Perez MD  2024 2:07 PM        Objective:     /70 (BP Location: Left arm, Patient Position: Sitting, Cuff Size: Standard)   Ht 5' 9\" (1.753 m)   Wt 64 kg (141 lb)   LMP 2024   BMI 20.82 kg/m²   Pregravid Weight/BMI: Pregravid weight not on file (BMI Could not be calculated)  Current Weight: 64 kg (141 lb)   Total Weight Gain: Not found.   Pre-Valentín Vitals      Flowsheet Row Most Recent Value   Prenatal Assessment    Prenatal Vitals    Blood Pressure 114/70   Weight - Scale 64 kg (141 lb)   Urine Albumin/Glucose    Dilation/Effacement/Station    Vaginal Drainage    Edema              Chaperone present? Yes: Monica Sousa MA.    General Appearance: alert and oriented, in no acute distress.   Skin: normal, no rash or abnormalities  Neurologic: alert, oriented x3  Psychiatric: Appropriate affect, mood stable, cooperative with exam.        Sid Perez MD  2024 2:08 PM  "

## 2024-11-08 ENCOUNTER — TELEPHONE (OUTPATIENT)
Age: 34
End: 2024-11-08

## 2024-11-19 NOTE — PATIENT INSTRUCTIONS
Congratulations on your pregnancy!  We thank you for allowing us to participate in your care.    NEXT STEPS    Go to the lab to have your prenatal bloodwork competed if you have not already done so.  There is a listing of [facility] and locations in your prenatal folder. You may also visit [email or web address] or call [phone].   Please be aware that some insurance companies may require you to go to a specific lab (ex. "[employer] or [employer]). You can verify this by contacting your insurance company.   If you have decided to be screened for CF and SMA genetic testing, these tests require prior authorization and scheduling.  Prior Authorization is not a guarantee of payment. There may be out of pocket expenses that includes copays, deductibles and or coinsurance for your individual plan.  Please call [phone] if our team has not contacted you in 7 business days.  Please have your blood work completed prior to you next prenatal visit.    If you have decided to have genetic testing done at Maternal Fetal Medicine, that will be scheduled by [facility]. You may have already scheduled this appointment.  If not, please call their office to schedule this appointment.  Based on the referral placed by our office, they will know how to schedule you appropriately.    Contact information for Maternal Fetal Medicine is located in your prenatal folder. The main phone number to their office is [phone].     Return to our office for your first routine prenatal visit.     Warning Signs During Pregnancy - If you experience any problems or concerns, call the office directly.  The list below includes warning signs your providers would like you to be aware of.  If you experience any of these at any time during your pregnancy, please call us as soon as possible.    Vaginal bleeding   Sharp abdominal pain that does not go away   Fever (more than 100.4?F and is not relieved with Tylenol)   Persistent vomiting lasting greater than 24  hours   Chest pain/Shortness of breath   Pain or burning when you urinate     Call the OFFICE [phone] for any questions/emergencies.  At night or on the weekend, calls go through a triage service, please indicate it is an emergency and the DOCTOR on call will be paged.    Remember to only use [employer] for non-urgent concerns or questions.    Our doctors deliver at [facility] in [place]. The address is provided below.     [facility]  [address] [place]  [address]     Please click on the links below to review our Pregnancy Essential Guide.    [facility] Pregnancy Essentials Guide  [facility] Women's Health ([email or web address])     [facility] - Virtual Tour (VendAsta)      To learn more about the Prenatal Education classes that [facility] offers, click the link below.  Prenatal Education Classes    Click on the link below to review [facility] Lab locations.  [facility] Lab Locations    [employer] resource  [employer] is a tool to connect you to community resources you may need.      Thank you,   Brooke JAIME, RN  OB Nurse Navigator

## 2024-11-21 ENCOUNTER — INITIAL PRENATAL (OUTPATIENT)
Dept: OBGYN CLINIC | Facility: CLINIC | Age: 34
End: 2024-11-21
Payer: COMMERCIAL

## 2024-11-21 VITALS
BODY MASS INDEX: 21.89 KG/M2 | HEIGHT: 69 IN | WEIGHT: 147.8 LBS | SYSTOLIC BLOOD PRESSURE: 110 MMHG | DIASTOLIC BLOOD PRESSURE: 50 MMHG

## 2024-11-21 DIAGNOSIS — Z31.430 ENCOUNTER OF FEMALE FOR TESTING FOR GENETIC DISEASE CARRIER STATUS FOR PROCREATIVE MANAGEMENT: ICD-10-CM

## 2024-11-21 DIAGNOSIS — Z83.3 FAMILY HISTORY OF DIABETES MELLITUS IN FATHER: ICD-10-CM

## 2024-11-21 DIAGNOSIS — Z76.89 ENCOUNTER FOR SOCIAL WORK INTERVENTION: ICD-10-CM

## 2024-11-21 DIAGNOSIS — O09.521 MULTIGRAVIDA OF ADVANCED MATERNAL AGE IN FIRST TRIMESTER: ICD-10-CM

## 2024-11-21 DIAGNOSIS — Z34.81 MULTIGRAVIDA IN FIRST TRIMESTER: Primary | ICD-10-CM

## 2024-11-21 DIAGNOSIS — Z36.9 ENCOUNTER FOR ANTENATAL SCREENING: ICD-10-CM

## 2024-11-21 PROCEDURE — T1001 NURSING ASSESSMENT/EVALUATN: HCPCS
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
## 2024-11-22 ENCOUNTER — PATIENT OUTREACH (OUTPATIENT)
Dept: OBGYN CLINIC | Facility: CLINIC | Age: 34
End: 2024-11-22

## 2024-11-22 NOTE — PROGRESS NOTES
SW referred for +SDOH screening (food, housing, utilities, transportation). Patient is , [id number] with an MARISELA of 25. CHIQUI called patient to complete assessment, no answer. CHIQUI left  requesting a call back. Patient's next appointment is scheduled for 24 at [facility].

## 2024-11-27 ENCOUNTER — PATIENT OUTREACH (OUTPATIENT)
Dept: OBGYN CLINIC | Facility: CLINIC | Age: 34
End: 2024-11-27

## 2024-11-27 NOTE — PROGRESS NOTES
Second attempt to contact patient regarding + SDOH screening (food, housing, utilities, transportation). No answer - SW left additional VM requesting a call back. Patient's next appointment is scheduled for 12/6/24 at [facility].     Due to lack of response from patient, SW sent UTR letter via [employer]. Notified provider who saw patient for last prenatal visit. SW closing referral at this time, please re-refer as needed.

## 2024-11-27 NOTE — LETTER
November 27, 2024    Thea Fay  [address]  [facility] [address]          Prosper Sidhu,    I hope you are doing well. I am reaching out because I have made a few attempts to call you since I was initially referred by the nurse on 11/21/24. Unfortunately, I have not been able to reach you! I apologize if I have been calling at a bad time.       If you are still interested in social work services, I kindly request that you contact me at [phone] so that I can assist with your care needs. My normal hours are Monday through Friday, 8:00am to 4:30pm.      Take care,    ALICIA Quigley, LSW

## 2024-11-29 ENCOUNTER — TELEPHONE (OUTPATIENT)
Dept: PERINATAL CARE | Facility: OTHER | Age: 34
End: 2024-11-29

## 2024-11-29 NOTE — TELEPHONE ENCOUNTER
Antonella regarding her nuchal appt. We don't have available opening with her timeframe and would like to schedule her genetic counselor or her level 2.please call at [phone]

## 2024-12-02 ENCOUNTER — ROUTINE PRENATAL (OUTPATIENT)
Dept: PERINATAL CARE | Facility: OTHER | Age: 34
End: 2024-12-02
Payer: COMMERCIAL

## 2024-12-02 ENCOUNTER — APPOINTMENT (OUTPATIENT)
Dept: LAB | Facility: HOSPITAL | Age: 34
End: 2024-12-02
Payer: COMMERCIAL

## 2024-12-02 ENCOUNTER — TELEPHONE (OUTPATIENT)
Facility: HOSPITAL | Age: 34
End: 2024-12-02

## 2024-12-02 VITALS
BODY MASS INDEX: 22.63 KG/M2 | SYSTOLIC BLOOD PRESSURE: 104 MMHG | HEART RATE: 80 BPM | WEIGHT: 152.8 LBS | DIASTOLIC BLOOD PRESSURE: 56 MMHG | HEIGHT: 69 IN

## 2024-12-02 DIAGNOSIS — Z31.430 ENCOUNTER OF FEMALE FOR TESTING FOR GENETIC DISEASE CARRIER STATUS FOR PROCREATIVE MANAGEMENT: ICD-10-CM

## 2024-12-02 DIAGNOSIS — Z3A.13 13 WEEKS GESTATION OF PREGNANCY: ICD-10-CM

## 2024-12-02 DIAGNOSIS — Z83.3 FAMILY HISTORY OF DIABETES MELLITUS IN FATHER: ICD-10-CM

## 2024-12-02 DIAGNOSIS — Z36.0 ENCOUNTER FOR ANTENATAL SCREENING FOR CHROMOSOMAL ANOMALIES: Primary | ICD-10-CM

## 2024-12-02 DIAGNOSIS — O09.521 MULTIGRAVIDA OF ADVANCED MATERNAL AGE IN FIRST TRIMESTER: ICD-10-CM

## 2024-12-02 DIAGNOSIS — Z34.81 MULTIGRAVIDA IN FIRST TRIMESTER: ICD-10-CM

## 2024-12-02 DIAGNOSIS — O09.521 AMA (ADVANCED MATERNAL AGE) MULTIGRAVIDA 35+, FIRST TRIMESTER: ICD-10-CM

## 2024-12-02 DIAGNOSIS — O09.521 SUPERVISION OF ELDERLY MULTIGRAVIDA IN FIRST TRIMESTER: ICD-10-CM

## 2024-12-02 LAB
ABO GROUP BLD: NORMAL
BASOPHILS # BLD AUTO: 0.08 THOUSANDS/ΜL (ref 0–0.1)
BASOPHILS NFR BLD AUTO: 1 % (ref 0–1)
BILIRUB UR QL STRIP: NEGATIVE
BLD GP AB SCN SERPL QL: NEGATIVE
CLARITY UR: CLEAR
COLOR UR: NORMAL
EOSINOPHIL # BLD AUTO: 0.37 THOUSAND/ΜL (ref 0–0.61)
EOSINOPHIL NFR BLD AUTO: 4 % (ref 0–6)
ERYTHROCYTE [DISTWIDTH] IN BLOOD BY AUTOMATED COUNT: 13.5 % (ref 11.6–15.1)
GLUCOSE 1H P 50 G GLC PO SERPL-MCNC: 67 MG/DL (ref 70–134)
GLUCOSE UR STRIP-MCNC: NEGATIVE MG/DL
HCT VFR BLD AUTO: 37.8 % (ref 36.5–46.1)
HGB BLD-MCNC: 12.5 G/DL (ref 12–15.4)
HGB UR QL STRIP.AUTO: NEGATIVE
IMM GRANULOCYTES # BLD AUTO: 0.12 THOUSAND/UL (ref 0–0.2)
IMM GRANULOCYTES NFR BLD AUTO: 1 % (ref 0–2)
KETONES UR STRIP-MCNC: NEGATIVE MG/DL
LEUKOCYTE ESTERASE UR QL STRIP: NEGATIVE
LYMPHOCYTES # BLD AUTO: 1.71 THOUSANDS/ΜL (ref 0.6–4.47)
LYMPHOCYTES NFR BLD AUTO: 18 % (ref 14–44)
MCH RBC QN AUTO: 30.9 PG (ref 26.8–34.3)
MCHC RBC AUTO-ENTMCNC: 33.1 G/DL (ref 31.4–37.4)
MCV RBC AUTO: 93 FL (ref 82–98)
MONOCYTES # BLD AUTO: 0.67 THOUSAND/ΜL (ref 0.17–1.22)
MONOCYTES NFR BLD AUTO: 7 % (ref 4–12)
NEUTROPHILS # BLD AUTO: 6.51 THOUSANDS/ΜL (ref 1.85–7.62)
NEUTS SEG NFR BLD AUTO: 69 % (ref 43–75)
NITRITE UR QL STRIP: NEGATIVE
NRBC BLD AUTO-RTO: 0 /100 WBCS
PH UR STRIP.AUTO: 6.5 [PH]
PLATELET # BLD AUTO: 250 THOUSANDS/UL (ref 149–390)
PMV BLD AUTO: 9.7 FL (ref 8.9–12.7)
PROT UR STRIP-MCNC: NEGATIVE MG/DL
RBC # BLD AUTO: 4.05 MILLION/UL (ref 3.88–5.12)
RH BLD: POSITIVE
RUBV IGG SERPL IA-ACNC: 30.2 IU/ML
SP GR UR STRIP.AUTO: 1.01 (ref 1–1.03)
SPECIMEN EXPIRATION DATE: NORMAL
UROBILINOGEN UR STRIP-ACNC: <2 MG/DL
WBC # BLD AUTO: 9.46 THOUSAND/UL (ref 4.31–10.16)

## 2024-12-02 PROCEDURE — 87389 HIV-1 AG W/HIV-1&-2 AB AG IA: CPT

## 2024-12-02 PROCEDURE — 87147 CULTURE TYPE IMMUNOLOGIC: CPT

## 2024-12-02 PROCEDURE — 99243 OFF/OP CNSLTJ NEW/EST LOW 30: CPT | Performed by: OBSTETRICS & GYNECOLOGY

## 2024-12-02 PROCEDURE — 86900 BLOOD TYPING SEROLOGIC ABO: CPT

## 2024-12-02 PROCEDURE — 76813 OB US NUCHAL MEAS 1 GEST: CPT | Performed by: OBSTETRICS & GYNECOLOGY

## 2024-12-02 PROCEDURE — 76801 OB US < 14 WKS SINGLE FETUS: CPT | Performed by: OBSTETRICS & GYNECOLOGY

## 2024-12-02 PROCEDURE — 87086 URINE CULTURE/COLONY COUNT: CPT

## 2024-12-02 PROCEDURE — 82950 GLUCOSE TEST: CPT

## 2024-12-02 PROCEDURE — 86803 HEPATITIS C AB TEST: CPT

## 2024-12-02 PROCEDURE — 86706 HEP B SURFACE ANTIBODY: CPT

## 2024-12-02 PROCEDURE — 86901 BLOOD TYPING SEROLOGIC RH(D): CPT

## 2024-12-02 PROCEDURE — 36415 COLL VENOUS BLD VENIPUNCTURE: CPT

## 2024-12-02 PROCEDURE — 86780 TREPONEMA PALLIDUM: CPT

## 2024-12-02 PROCEDURE — 83020 HEMOGLOBIN ELECTROPHORESIS: CPT

## 2024-12-02 PROCEDURE — 81003 URINALYSIS AUTO W/O SCOPE: CPT

## 2024-12-02 PROCEDURE — 85025 COMPLETE CBC W/AUTO DIFF WBC: CPT

## 2024-12-02 PROCEDURE — 87340 HEPATITIS B SURFACE AG IA: CPT

## 2024-12-02 PROCEDURE — 36415 COLL VENOUS BLD VENIPUNCTURE: CPT | Performed by: OBSTETRICS & GYNECOLOGY

## 2024-12-02 PROCEDURE — 86762 RUBELLA ANTIBODY: CPT

## 2024-12-02 PROCEDURE — 86850 RBC ANTIBODY SCREEN: CPT

## 2024-12-02 NOTE — TELEPHONE ENCOUNTER
Patient called back in returning call. Not sure how this patient should be scheduled. Sent info to sup for follow up.

## 2024-12-02 NOTE — LETTER
"2024    Sid Perez MD  [address]  [address]  [facility] [address]    Patient: Thea Fay   YOB: 1990   Date of Visit: 2024   Gestational age 13w0d   Nature of this communication: Routine       Dear Dr Perez,    This patient was seen recently in our  office.  The content of my evaluation today is in the ultrasound report under \"OB Procedures\" tab.     Please don't hesitate to contact our office with any concerns or questions.     Sincerely,      Oumou Vickers MD  Attending Physician, Maternal-Fetal Medicine  [facility]      "

## 2024-12-02 NOTE — PROGRESS NOTES
Patient chose to have [facility] [id number] Non-Invasive Prenatal Screen [id number] PLUS w/ SCA, WITH fetal sex (per pt request).  Patient choose to be billed through insurance.     Patient given brochure and is aware [facility] will contact patient's insurance and coordinate coverage.  Provided [facility] contact information. General inquiries [phone], Cost estimates [phone] and [facility] Billing [phone]. Website [email or web address].     Blood collection tubes labeled with patient identifiers (name, medical record number, and date of birth).     Filled out [facility] order form. Patient chose to have blood drawn in our office at time of visit. NIPS was drawn from right arm with a butterfly needle by JANNETET Cassidy MA. .      If patient chose to have blood work drawn at a [facility] lab we requested patient notify MFM (via phone call or AwayFind message) when blood collected so office can follow up on results.       Maternal Fetal Medicine will have results in approximately 5-7 business days and will call patient or notify via AwayFind.  Patient aware viewing lab result online will reveal fetal sex if ordered.    Patient verbalized understanding of all instructions and no questions at this time.

## 2024-12-02 NOTE — PROGRESS NOTES
"[facility]: Thea Fay was seen today for nuchal translucency ultrasound.  See ultrasound report under \"OB Procedures\" tab.   Please don't hesitate to contact our office with any concerns or questions.  -Oumou Vickers MD    "

## 2024-12-03 LAB
BACTERIA UR CULT: ABNORMAL
BACTERIA UR CULT: ABNORMAL
HBV SURFACE AB SER-ACNC: 336 MIU/ML
HBV SURFACE AG SER QL: NORMAL
HCV AB SER QL: NORMAL
HIV 1+2 AB+HIV1 P24 AG SERPL QL IA: NORMAL
HIV 2 AB SERPL QL IA: NORMAL
HIV1 AB SERPL QL IA: NORMAL
HIV1 P24 AG SERPL QL IA: NORMAL
TREPONEMA PALLIDUM IGG+IGM AB [PRESENCE] IN SERUM OR PLASMA BY IMMUNOASSAY: NORMAL

## 2024-12-05 LAB
HGB A MFR BLD: 2.5 % (ref 1.8–3.2)
HGB A MFR BLD: 97.5 % (ref 96.4–98.8)
HGB F MFR BLD: 0 % (ref 0–2)
HGB FRACT BLD-IMP: NORMAL
HGB S MFR BLD: 0 %

## 2024-12-06 ENCOUNTER — RESULTS FOLLOW-UP (OUTPATIENT)
Dept: PERINATAL CARE | Facility: CLINIC | Age: 34
End: 2024-12-06

## 2024-12-06 ENCOUNTER — INITIAL PRENATAL (OUTPATIENT)
Dept: OBGYN CLINIC | Facility: CLINIC | Age: 34
End: 2024-12-06
Payer: COMMERCIAL

## 2024-12-06 VITALS
HEIGHT: 69 IN | BODY MASS INDEX: 22.07 KG/M2 | WEIGHT: 149 LBS | SYSTOLIC BLOOD PRESSURE: 110 MMHG | DIASTOLIC BLOOD PRESSURE: 60 MMHG

## 2024-12-06 DIAGNOSIS — F19.91 HISTORY OF DRUG USE: ICD-10-CM

## 2024-12-06 DIAGNOSIS — Z12.4 SCREENING FOR CERVICAL CANCER: ICD-10-CM

## 2024-12-06 DIAGNOSIS — O09.521 AMA (ADVANCED MATERNAL AGE) MULTIGRAVIDA 35+, FIRST TRIMESTER: ICD-10-CM

## 2024-12-06 DIAGNOSIS — O99.331 PREGNANCY COMPLICATED BY TOBACCO USE IN FIRST TRIMESTER: ICD-10-CM

## 2024-12-06 DIAGNOSIS — R82.71 GBS BACTERIURIA: ICD-10-CM

## 2024-12-06 DIAGNOSIS — Z3A.13 13 WEEKS GESTATION OF PREGNANCY: Primary | ICD-10-CM

## 2024-12-06 DIAGNOSIS — Z36.89 ENCOUNTER FOR OTHER SPECIFIED ANTENATAL SCREENING: ICD-10-CM

## 2024-12-06 LAB
CFDNA.FET/CFDNA.TOTAL SFR FETUS: NORMAL %
CITATION REF LAB TEST: NORMAL
CITATION REF LAB TEST: NORMAL
CLINICAL INFO: NORMAL
ETHNIC BACKGROUND STATED: NORMAL
FET 13+18+21+X+Y ANEUP PLAS.CFDNA: NEGATIVE
FET CHR 21 TS PLAS.CFDNA QL: NEGATIVE
FET CHR 21 TS PLAS.CFDNA QL: NEGATIVE
FET MS X RISK WBC.DNA+CFDNA QL: NOT DETECTED
FET SEX PLAS.CFDNA DOSAGE CFDNA: NORMAL
FET TS 13 RISK PLAS.CFDNA QL: NEGATIVE
FET X + Y ANEUP RISK PLAS.CFDNA SEQ-IMP: NOT DETECTED
GA EST FROM CONCEPTION DATE: NORMAL D
GENE DIS ANL CARRIER INTERP-IMP: NORMAL
GENE MUT TESTED BLD/T: NORMAL
GESTATIONAL AGE > 9:: YES
LAB DIRECTOR NAME PROVIDER: NORMAL
LABORATORY COMMENT REPORT: NORMAL
LIMITATIONS OF THE TEST: NORMAL
NEGATIVE PREDICTIVE VALUE: NORMAL
PERFORMANCE CHARACTERISTICS: NORMAL
POSITIVE PREDICTIVE VALUE: NORMAL
REASON FOR REFERRAL (NARRATIVE): NORMAL
RECOMMENDATION PATIENT DOC-IMP: NORMAL
REF LAB TEST METHOD: NORMAL
REF LAB TEST METHOD: NORMAL
SERVICE CMNT-IMP: NORMAL
SL AMB  POCT GLUCOSE, UA: NORMAL
SL AMB NOTE:: NORMAL
SL AMB POCT URINE PROTEIN: NORMAL
SMN1 GENE MUT ANL BLD/T: NORMAL
SPECIMEN SOURCE: NORMAL
TEST PERFORMANCE INFO SPEC: NORMAL

## 2024-12-06 PROCEDURE — 81002 URINALYSIS NONAUTO W/O SCOPE: CPT | Performed by: OBSTETRICS & GYNECOLOGY

## 2024-12-06 PROCEDURE — 87491 CHLMYD TRACH DNA AMP PROBE: CPT | Performed by: OBSTETRICS & GYNECOLOGY

## 2024-12-06 PROCEDURE — 99213 OFFICE O/P EST LOW 20 MIN: CPT | Performed by: OBSTETRICS & GYNECOLOGY

## 2024-12-06 PROCEDURE — 87591 N.GONORRHOEAE DNA AMP PROB: CPT | Performed by: OBSTETRICS & GYNECOLOGY

## 2024-12-06 NOTE — PROGRESS NOTES
"Routine Prenatal Visit  [facility] - [address], [address], [place] [address]    Assessment/Plan:  Thea is a 34 y.o. year old  at 13w4d who presents for routine prenatal visit.     Assessment & Plan  13 weeks gestation of pregnancy    Orders:    POCT urine dip    AMA (advanced maternal age) multigravida 35+, first trimester     mg weeks 12 to 36       Screening for cervical cancer    Orders:    Liquid-based pap, screening    Chlamydia/GC amplified DNA by PCR    GBS bacteriuria     -  treat in labor       History of drug use     -  clean for 2 years       Pregnancy complicated by tobacco use in first trimester           Encounter for other specified  screening    Orders:    Alpha fetoprotein, maternal; Future      Next OB Visit 4 weeks.    Subjective:     CC: Prenatal care    Thea Fay is a 34 y.o.  adult who presents for routine prenatal care at 13w4d.  Pregnancy ROS: no leakage of fluid, pelvic pain, or vaginal bleeding.  normal fetal movement.    The following portions of the patient's history were reviewed and updated as appropriate: allergies, current medications, past family history, past medical history, obstetric history, gynecologic history, past social history, past surgical history and problem list.      Objective:  /60 (BP Location: Right arm, Patient Position: Sitting, Cuff Size: Standard)   Ht 5' 9\" (1.753 m)   Wt 67.6 kg (149 lb)   LMP 2024   BMI 22.00 kg/m²   Pregravid Weight/BMI: 59.9 kg (132 lb) (BMI 19.48)  Current Weight: 67.6 kg (149 lb)   Total Weight Gain: 7.711 kg (17 lb)   Pre- Vitals      Flowsheet Row Most Recent Value   Prenatal Assessment    Fetal Heart Rate 150   Fundal Height (cm) 13 cm   Movement Absent   Prenatal Vitals    Blood Pressure 110/60   Weight - Scale 67.6 kg (149 lb)   Urine Albumin/Glucose    Dilation/Effacement/Station    Cervical Dilation 0   Cervical Effacement 0   Vaginal Drainage  "   Draining Fluid No   Edema              General: Well appearing, no distress  Abdomen: Soft, gravid, nontender  Extremities: Non tender.

## 2024-12-09 ENCOUNTER — RESULTS FOLLOW-UP (OUTPATIENT)
Dept: OBGYN CLINIC | Facility: CLINIC | Age: 34
End: 2024-12-09

## 2024-12-10 LAB
C TRACH DNA SPEC QL NAA+PROBE: NEGATIVE
N GONORRHOEA DNA SPEC QL NAA+PROBE: NEGATIVE

## 2024-12-23 LAB
CFTR FULL MUT ANL BLD/T SEQ: NORMAL
CITATION REF LAB TEST: NORMAL
CLINICAL INFO: NORMAL
ETHNIC BACKGROUND STATED: NORMAL
GENE DIS ANL CARRIER INTERP-IMP: NORMAL
INDICATION: NORMAL
LAB DIRECTOR NAME PROVIDER: NORMAL
RECOMMENDATION PATIENT DOC-IMP: NORMAL
REF LAB TEST METHOD: NORMAL
SERVICE CMNT-IMP: NORMAL
SPECIMEN SOURCE: NORMAL

## 2025-01-06 ENCOUNTER — ROUTINE PRENATAL (OUTPATIENT)
Dept: OBGYN CLINIC | Facility: CLINIC | Age: 35
End: 2025-01-06
Payer: COMMERCIAL

## 2025-01-06 VITALS
DIASTOLIC BLOOD PRESSURE: 56 MMHG | WEIGHT: 166 LBS | HEIGHT: 69 IN | BODY MASS INDEX: 24.59 KG/M2 | SYSTOLIC BLOOD PRESSURE: 100 MMHG

## 2025-01-06 DIAGNOSIS — R82.71 GBS BACTERIURIA: ICD-10-CM

## 2025-01-06 DIAGNOSIS — O09.522 MULTIGRAVIDA OF ADVANCED MATERNAL AGE IN SECOND TRIMESTER: Primary | ICD-10-CM

## 2025-01-06 DIAGNOSIS — Z3A.18 18 WEEKS GESTATION OF PREGNANCY: ICD-10-CM

## 2025-01-06 LAB
SL AMB  POCT GLUCOSE, UA: NORMAL
SL AMB POCT URINE PROTEIN: NORMAL

## 2025-01-06 PROCEDURE — 99213 OFFICE O/P EST LOW 20 MIN: CPT | Performed by: OBSTETRICS & GYNECOLOGY

## 2025-01-06 PROCEDURE — 81002 URINALYSIS NONAUTO W/O SCOPE: CPT | Performed by: OBSTETRICS & GYNECOLOGY

## 2025-01-06 RX ORDER — ASPIRIN 81 MG/1
TABLET ORAL
COMMUNITY
Start: 2024-12-01

## 2025-01-06 RX ORDER — ASPIRIN 81 MG/1
81 TABLET, CHEWABLE ORAL DAILY
COMMUNITY
End: 2025-01-06 | Stop reason: CLARIF

## 2025-01-06 NOTE — PROGRESS NOTES
"Routine Prenatal Visit  [facility] [address], [address], [place] [address]    Assessment/Plan:  Thea is a 34 y.o. year old  at 18w0d who presents for routine prenatal visit.     1. Multigravida of advanced maternal age in second trimester  Assessment & Plan:  Has anatomy scheduled, will get MSAFP.   2. 18 weeks gestation of pregnancy  -     POCT urine dip  3. GBS bacteriuria  Assessment & Plan:  Treat in labor        Subjective:   Thea Fay is a 34 y.o.  who presents for routine prenatal care at 18w0d.  Complaints today: Pregnancy related muscular pain  LOF: -; VB: -; Contractions: -; FM: +    Objective:  /56 (BP Location: Right arm, Patient Position: Sitting, Cuff Size: Standard)   Ht 5' 9\" (1.753 m)   Wt 75.3 kg (166 lb)   LMP 2024   BMI 24.51 kg/m²     General: Well appearing, no distress  Respiratory: Unlabored breathing  Abdomen: Soft, gravid, nontender  Extremities: Warm and well perfused.  Non tender.    Pregravid Weight/BMI: 59.9 kg (132 lb) (BMI 19.48)  Current Weight: 75.3 kg (166 lb)   Total Weight Gain: 15.4 kg (34 lb)     Pre-Valentín Vitals      Flowsheet Row Most Recent Value   Prenatal Assessment    Fetal Heart Rate 143   Movement Present   Prenatal Vitals    Blood Pressure 100/56   Weight - Scale 75.3 kg (166 lb)   Urine Albumin/Glucose    Dilation/Effacement/Station    Vaginal Drainage    Edema              Miya Mosley DO  2025 4:51 PM     "

## 2025-01-10 ENCOUNTER — APPOINTMENT (OUTPATIENT)
Dept: LAB | Facility: HOSPITAL | Age: 35
End: 2025-01-10
Payer: COMMERCIAL

## 2025-01-10 DIAGNOSIS — Z36.89 ENCOUNTER FOR OTHER SPECIFIED ANTENATAL SCREENING: ICD-10-CM

## 2025-01-10 PROCEDURE — 36415 COLL VENOUS BLD VENIPUNCTURE: CPT

## 2025-01-10 PROCEDURE — 82105 ALPHA-FETOPROTEIN SERUM: CPT

## 2025-01-14 ENCOUNTER — TELEPHONE (OUTPATIENT)
Dept: OBGYN CLINIC | Facility: MEDICAL CENTER | Age: 35
End: 2025-01-14

## 2025-01-14 DIAGNOSIS — Z76.89 ENCOUNTER FOR SOCIAL WORK INTERVENTION: Primary | ICD-10-CM

## 2025-01-14 DIAGNOSIS — Z3A.19 19 WEEKS GESTATION OF PREGNANCY: ICD-10-CM

## 2025-01-14 DIAGNOSIS — Z34.82 MULTIGRAVIDA IN SECOND TRIMESTER: ICD-10-CM

## 2025-01-14 NOTE — TELEPHONE ENCOUNTER
Attempted to contact patient for 2nd Trimester Check-in Call. Pt unavailable. aCon msg was sent  1/13/25.  Left msg to reach out w/questions or concerns.

## 2025-01-15 LAB
2ND TRIMESTER 4 SCREEN SERPL-IMP: NORMAL
AFP ADJ MOM SERPL: 0.61
AFP INTERP AMN-IMP: NORMAL
AFP INTERP SERPL-IMP: NORMAL
AFP INTERP SERPL-IMP: NORMAL
AFP SERPL-MCNC: 28.3 NG/ML
AGE AT DELIVERY: 35.6 YR
GA METHOD: NORMAL
GA: 18.6 WEEKS
IDDM PATIENT QL: NO
MULTIPLE PREGNANCY: NO
NEURAL TUBE DEFECT RISK FETUS: NORMAL %

## 2025-01-15 NOTE — TELEPHONE ENCOUNTER
.2ND TRIMESTER CHECK-IN CALL     Overall how are you doing? Patient reports she is doing well overall, but notes round ligament discomfort - reviewed body mechanics, heating pad on low, tylenol as needed, verbalized understanding - offered office visit for evaluation, declined at this time, states she did bring it up at her last PNV, she stated that the provider discussed the same thing with her.    Compliant with routine OB care appointments? Yes    Have you completed your 1st trimester labs? Yes    If you had NIPS with MFM, do you have a order for MSAFP? MSAFP has been completed.   Can be completed 15w-22w6d, ideally 16w-18w    Have you seen MFM and do you have your detailed US scheduled? Yes, scheduled 1/31/25.    Pregnancy Education-have you had a chance to review the classes offered and registered? Yes, patient registered for classes.    Additional Notes: pt states that since she has not been able to move around like she was able to her job told her she would have to resign or get fired.  States that she is having a hard time with just one income, pt asked if she could be referred to the SW that she had seen in her early pregnancy.  Will send referral, pt appreciative.

## 2025-01-16 ENCOUNTER — PATIENT OUTREACH (OUTPATIENT)
Dept: OBGYN CLINIC | Facility: CLINIC | Age: 35
End: 2025-01-16

## 2025-01-16 NOTE — PROGRESS NOTES
SW received referral from OB Navigator Brooke following 2nd Trimester check in call. Patient reported continued financial strain, and possible loss of her job. SW had initially been referred at start of pregnancy for SDOH (food, housing, utilities, transportation). SW called patient x2 and sent UTR due to lack of response.     SW called patient again today to complete assessment - no answer. SW left  requesting a call back. Patient's next appointment is scheduled for 1/28/25 with NICOLASA Stark. Note routed to OB Navigator as JED.

## 2025-01-29 ENCOUNTER — PATIENT OUTREACH (OUTPATIENT)
Dept: OBGYN CLINIC | Facility: CLINIC | Age: 35
End: 2025-01-29

## 2025-01-29 NOTE — PROGRESS NOTES
Second attempt to contact patient regarding SDOH/financial strain. SW had also been referred at start of pregnancy but was unable to reach patient. Additional call placed to patient today - no answer. SW left VM requesting a call back. Patient's next appt is scheduled for 1/31/25 with NICOLASA Stark.     Due to lack of response from patient, SW sent additional UTR letter via [employer]. Note routed to OB Navigator & provider. CHIQUI closing referral, please re-refer as needed.

## 2025-01-29 NOTE — LETTER
January 29, 2025    Thea Fay  [address]  [place] [address]        Prosper Issa,      I hope you are doing well. I am a  with [facility]. I am reaching out because I have made a few attempts to contact you by phone since I received a new referral from our nurse Brooke. Unfortunately, I have not been able to reach you! I apologize if I have been calling at a bad time.       If you are still interested in social work services/resources, I kindly request that you call me at [phone] so that I can assist with your care needs. My normal hours are Monday through Friday, 8:00am to 4:30pm.      Take care,      ALICIA Quigley, LSW

## 2025-01-31 ENCOUNTER — ROUTINE PRENATAL (OUTPATIENT)
Dept: OBGYN CLINIC | Facility: CLINIC | Age: 35
End: 2025-01-31
Payer: COMMERCIAL

## 2025-01-31 ENCOUNTER — ROUTINE PRENATAL (OUTPATIENT)
Dept: PERINATAL CARE | Facility: OTHER | Age: 35
End: 2025-01-31
Payer: COMMERCIAL

## 2025-01-31 VITALS
SYSTOLIC BLOOD PRESSURE: 92 MMHG | HEART RATE: 76 BPM | HEIGHT: 69 IN | BODY MASS INDEX: 23.96 KG/M2 | DIASTOLIC BLOOD PRESSURE: 60 MMHG | WEIGHT: 161.8 LBS

## 2025-01-31 VITALS
SYSTOLIC BLOOD PRESSURE: 98 MMHG | BODY MASS INDEX: 24.2 KG/M2 | WEIGHT: 163.4 LBS | DIASTOLIC BLOOD PRESSURE: 50 MMHG | HEIGHT: 69 IN

## 2025-01-31 DIAGNOSIS — Z34.82 MULTIGRAVIDA IN SECOND TRIMESTER: ICD-10-CM

## 2025-01-31 DIAGNOSIS — Z3A.21 21 WEEKS GESTATION OF PREGNANCY: Primary | ICD-10-CM

## 2025-01-31 DIAGNOSIS — O99.332 PREGNANCY COMPLICATED BY TOBACCO USE IN SECOND TRIMESTER: ICD-10-CM

## 2025-01-31 DIAGNOSIS — O09.522 MULTIGRAVIDA OF ADVANCED MATERNAL AGE IN SECOND TRIMESTER: ICD-10-CM

## 2025-01-31 DIAGNOSIS — Z36.3 ENCOUNTER FOR ANTENATAL SCREENING FOR MALFORMATION: ICD-10-CM

## 2025-01-31 DIAGNOSIS — Z36.86 ENCOUNTER FOR ANTENATAL SCREENING FOR CERVICAL LENGTH: ICD-10-CM

## 2025-01-31 DIAGNOSIS — F19.91 HISTORY OF DRUG USE: ICD-10-CM

## 2025-01-31 PROBLEM — M79.641 RIGHT HAND PAIN: Status: RESOLVED | Noted: 2019-07-09 | Resolved: 2025-01-31

## 2025-01-31 LAB
SL AMB  POCT GLUCOSE, UA: NORMAL
SL AMB POCT URINE PROTEIN: NORMAL

## 2025-01-31 PROCEDURE — 99213 OFFICE O/P EST LOW 20 MIN: CPT | Performed by: OBSTETRICS & GYNECOLOGY

## 2025-01-31 PROCEDURE — 76811 OB US DETAILED SNGL FETUS: CPT | Performed by: OBSTETRICS & GYNECOLOGY

## 2025-01-31 PROCEDURE — 76817 TRANSVAGINAL US OBSTETRIC: CPT | Performed by: OBSTETRICS & GYNECOLOGY

## 2025-01-31 PROCEDURE — 81002 URINALYSIS NONAUTO W/O SCOPE: CPT | Performed by: OBSTETRICS & GYNECOLOGY

## 2025-01-31 NOTE — PROGRESS NOTES
"[facility]: Thea Fay was seen today for anatomic survey and cervical length screening ultrasound.  See ultrasound report under \"OB Procedures\" tab.   Please don't hesitate to contact our office with any concerns or questions.  -Oumou Vickers MD      "

## 2025-01-31 NOTE — PATIENT INSTRUCTIONS
NUTRITION IN PREGNANCY  Good Nutrition is a VERY important part of having a healthy pregnancy and healthy baby.  You should follow a healthy diet which include the following:   * Vegetables (which are dark green and leafy): at least 2 servings each day   * Protein (meat, eggs, beans, nuts, peanut butter): 3-4 servings each day   * Breads/whole grains (bread, pasta, rice, tortillas, potatoes): 3 servings each day   * Dairy (milk, yogurt, cheese): 3-4 servings each day   * Water: 6-8 glasses per day   * Calories: approximately 2000 to 2200 calories per day     WEIGHT GAIN   Recommended weight gain for you during your pregnancy is based on your body mass index (BMI) at the time that you became pregnant.   Pre-pregnant BMI Recommended weight gain   Underweight (BMI less than 18.5) 28 to 40 pounds   Normal weight (BMI 18.5-24.9) 25 to 35 pounds   Overweight (BMI 25-29.9) 15 to 25 pounds   Obese (BMI 30 or greater) 11 to 20 pounds     FOOD SAFETY   It is VERY important to eat only safely-prepared foods during pregnancy as you and your baby have a higher risk than usual for being affected by foodborne illnesses.  Follow these steps to ensure that you and your baby are safe from foodborne illnesses while you are pregnant:   wash hands thoroughly with warm water and soap before and after handling any foods   wash cutting boards, dishes, utensils, and countertops with hot water and soap before and after preparing any foods   rinse raw fruits and vegetables thoroughly under running water before eating   keep raw meat and seafood separate from other foods and use different cutting boards/utensils to handle raw meat than for other foods   put cooked food on a freshly clean plate   cook all of your foods thoroughly   discard foods that have been left out for more than 2 hours   refrigerate or freeze any foods than can spoil     There are three particular foodborne risks that you should be aware of and avoid as they can cause  serious harm to your unborn child.     * Listeria (a harmful bacteria)   don’t eat hot dogs or deli meats (unless they’re reheated until steaming hot)   don’t eat soft cheeses (such as Feta, Brie, Camembert) unless they are specifically labeled as being “made with pasteurized milk”   don’t drink raw (unpasteurized) milk   don’t eat refrigerated pates or meat spreads   don’t eat refrigerated smoked seafood unless it’s in a cooked dish like a casserole     * Mercury (a metal which is found in certain fish in high levels)   don’t eat shark, tilefish, armond mackerel, or swordfish   don’t eat more than 12 ounces per week of shrimp, salmon, pollock, or catfish   when eating tuna fish, you can have up to 6 ounces per week of canned albacore tuna OR up to 12 ounces of canned light tuna     * Toxoplasma (a harmful parasite)   cook meat thoroughly before eating   wear gloves when gardening or handling sand from a child’s sandbox   if you ha tve a cat, have someone else change the litter box while you are pregnant.    if you HAVE to clean it yourself, be sure to wash your hands thoroughly afterwards with warm water and soap.   don’t get a NEW cat while you are pregnant

## 2025-01-31 NOTE — PROGRESS NOTES
Ultrasound Probe Disinfection    A transvaginal ultrasound was performed.   Prior to use, disinfection was performed with High Level Disinfection Process ([employer]).  Probe serial number [id number] was used.    Roxann León  01/31/25  7:54 AM

## 2025-01-31 NOTE — PROGRESS NOTES
"Routine Prenatal Visit  [facility] - [address], [address], [place] [address]    Assessment/Plan:  Thea is a 34 y.o. year old  at 21w4d who presents for routine prenatal visit.     1. 21 weeks gestation of pregnancy  -     POCT urine dip  2. Multigravida in second trimester  3. History of drug use  4. Pregnancy complicated by tobacco use in second trimester  Comments:  4 weeks  clean of smoking    + fm  no utcx   us reviewed. Needs fu.  + co of pelvic  pain w  movement.  No dysuria or frequency.  No bleeding  Lost job at   [employer].   trying to get a hold or patient.     Dw pt and  meagan card given.      Subjective:     CC: Prenatal care    Thea Fay is a 34 y.o.  female who presents for routine prenatal care at 21w4d.  Pregnancy ROS: no  leakage of fluid, pelvic pain, or vaginal bleeding.  + fetal movement.    The following portions of the patient's history were reviewed and updated as appropriate: allergies, current medications, past family history, past medical history, obstetric history, gynecologic history, past social history, past surgical history and problem list.      Objective:  BP 98/50 (BP Location: Left arm, Patient Position: Sitting, Cuff Size: Standard)   Ht 5' 9\" (1.753 m)   Wt 74.1 kg (163 lb 6.4 oz)   LMP 2024   BMI 24.13 kg/m²   Pregravid Weight/BMI: 59.9 kg (132 lb) (BMI 19.48)  Current Weight: 74.1 kg (163 lb 6.4 oz)   Total Weight Gain: 14.2 kg (31 lb 6.4 oz)   Pre-Valentín Vitals    Flowsheet Row Most Recent Value   Prenatal Assessment    Fetal Heart Rate 143   Fundal Height (cm) 21 cm   Movement Present   Prenatal Vitals    Blood Pressure 98/50   Weight - Scale 74.1 kg (163 lb 6.4 oz)   Urine Albumin/Glucose    Dilation/Effacement/Station    Vaginal Drainage    Draining Fluid No   Edema    LLE Edema None   RLE Edema None   Facial Edema None           General: Well appearing, no distress  Respiratory: Unlabored " breathing  Cardiovascular: Regular rate.  Abdomen: Soft, gravid, nontender  Fundal Height: Appropriate for gestational age.  Extremities: Warm and well perfused.  Non tender.

## 2025-01-31 NOTE — PROGRESS NOTES
"Routine Prenatal Visit  [facility] - [address], [address], [place] [address]    Assessment/Plan:  Thea is a 34 y.o. year old  at 21w4d who presents for routine prenatal visit.     1. 21 weeks gestation of pregnancy  -     POCT urine dip  2. Multigravida in second trimester  3. History of drug use  4. Pregnancy complicated by tobacco use in second trimester  Comments:  4 weeks  clean of smoking    + fm   no  cramping. Co  pelvic pain   when moving most days.  Let go from job  at  [employer] due to discomfort.   No dysuria or frequency .   No bleeding.  Dw pt   pregnancy belt for support , hydration .  If  no improvement to consider P{T evaluation.    has been trying to reach patient.   Cyndy card given to pt.   20 week us  reviewed  needs fu .     Subjective:     CC: Prenatal care    Thea Fay is a 34 y.o.  female who presents for routine prenatal care at 21w4d.  Pregnancy ROS: no  leakage of fluid, pelvic pain, or vaginal bleeding.  + fetal movement.    The following portions of the patient's history were reviewed and updated as appropriate: allergies, current medications, past family history, past medical history, obstetric history, gynecologic history, past social history, past surgical history and problem list.      Objective:  BP 98/50 (BP Location: Left arm, Patient Position: Sitting, Cuff Size: Standard)   Ht 5' 9\" (1.753 m)   Wt 74.1 kg (163 lb 6.4 oz)   LMP 2024   BMI 24.13 kg/m²   Pregravid Weight/BMI: 59.9 kg (132 lb) (BMI 19.48)  Current Weight: 74.1 kg (163 lb 6.4 oz)   Total Weight Gain: 14.2 kg (31 lb 6.4 oz)   Pre- Vitals    Flowsheet Row Most Recent Value   Prenatal Assessment    Fetal Heart Rate 143   Fundal Height (cm) 21 cm   Movement Present   Prenatal Vitals    Blood Pressure 98/50   Weight - Scale 74.1 kg (163 lb 6.4 oz)   Urine Albumin/Glucose    Dilation/Effacement/Station    Vaginal Drainage    Draining Fluid " No   Edema    LLE Edema None   RLE Edema None   Facial Edema None           General: Well appearing, no distress  Respiratory: Unlabored breathing  Cardiovascular: Regular rate.  Abdomen: Soft, gravid, nontender  Fundal Height: Appropriate for gestational age.  Extremities: Warm and well perfused.  Non tender.

## 2025-02-15 ENCOUNTER — NURSE TRIAGE (OUTPATIENT)
Dept: OTHER | Facility: OTHER | Age: 35
End: 2025-02-15

## 2025-02-15 ENCOUNTER — HOSPITAL ENCOUNTER (OUTPATIENT)
Facility: HOSPITAL | Age: 35
Discharge: HOME | End: 2025-02-15
Attending: OBSTETRICS & GYNECOLOGY | Admitting: OBSTETRICS & GYNECOLOGY
Payer: COMMERCIAL

## 2025-02-15 VITALS
TEMPERATURE: 99.3 F | SYSTOLIC BLOOD PRESSURE: 109 MMHG | HEART RATE: 78 BPM | OXYGEN SATURATION: 97 % | HEIGHT: 69 IN | RESPIRATION RATE: 12 BRPM | BODY MASS INDEX: 23.7 KG/M2 | DIASTOLIC BLOOD PRESSURE: 53 MMHG | WEIGHT: 160 LBS

## 2025-02-15 PROBLEM — O36.8120 DECREASED FETAL MOVEMENTS IN SECOND TRIMESTER: Status: ACTIVE | Noted: 2025-02-15

## 2025-02-15 PROCEDURE — 99212 OFFICE O/P EST SF 10 MIN: CPT

## 2025-02-15 PROCEDURE — 59025 FETAL NON-STRESS TEST: CPT | Performed by: OBSTETRICS & GYNECOLOGY

## 2025-02-15 PROCEDURE — 76815 OB US LIMITED FETUS(S): CPT | Performed by: OBSTETRICS & GYNECOLOGY

## 2025-02-15 NOTE — TELEPHONE ENCOUNTER
"Regarding: decreased fetal movement/23 weeks pregnant  ----- Message from Ashlyn BURROUGHS sent at 2/15/2025  8:31 AM EST -----  \"I'm 23 weeks pregnant and I haven't felt baby move for a couple of days\"    "

## 2025-02-15 NOTE — PROCEDURES
Thea Sumeet, a  at 23w5d with an MARISELA of 2025, by Last Menstrual Period, was seen at [facility] for the following procedure(s): $Procedure Type: NAHED, NST]    Nonstress Test  Reason for NST: Decreased Fetal Movement  Variability: Moderate  Decelerations: None  Accelerations: No  Baseline: 150 BPM  Uterine Irritability: No  Contractions: Not present    4 Quadrant NAHED  LVP (cm): 5.1 cm              Interpretation  Nonstress Test Interpretation:  (appropriate for EGA)  Overall Impression: Reassuring

## 2025-02-15 NOTE — TELEPHONE ENCOUNTER
Per on call provider-    Pt should go to L&D.    Pt called and told of instructions and verbalized understanding.

## 2025-02-15 NOTE — H&P
L&D Triage Note - OB/GYN  Thea Fay 34 y.o. female MRN: [id number]  Unit/Bed#:  TRIAGE  Encounter: [id number]      Assessment:  34 y.o.  at 23w5d decreased fetal movement    Plan:  NST appropriate for EGA  Bedside sono confirms active fetal movement and normal NAHED;  reassured patient      ______________________________________________________________________      Chief Compliant: Decreased FM x 2 days    TIME: 10:12 am  Subjective:  34 y.o.  at 23w5d who presents with decreased FM.  No LOF, VB, or abdominal pain.  No recent abdominal trauma or illness.      Objective:  Vitals:    02/15/25 0946   BP: 109/53   Pulse: 78   Resp: 12   Temp: 99.3 °F (37.4 °C)   SpO2: 97%       SVE: deferred  FHT:  150 bpm ; appropriate for EGA  [place]: quiet    Bedside Sono:  transverse back up presentation.  Intact anterior placenta.  Normal fluid; LVP 5.1 cm   Active fetus.          Regina Mchugh MD 2/15/2025 10:11 AM

## 2025-02-15 NOTE — TELEPHONE ENCOUNTER
"Reason for Disposition  • [1] Pregnant 23 or more weeks AND [2] baby moving less today by kick count  (e.g., kick count < 5 in 1 hour or < 10 in 2 hours)    Answer Assessment - Initial Assessment Questions  1. FETAL MOVEMENT: \"Has the baby's movement decreased or changed significantly from normal?\" (e.g., yes, no; describe)      Very subtle movements here and there, baby has been very active the last few weeks but last two days almost nothing.    2. MARISELA: \"What date are you expecting to deliver?\"       6/9/25    3. PREGNANCY: \"How many weeks pregnant are you?\"       23 weeks and 5 dasy    4. OTHER SYMPTOMS: \"Do you have any other symptoms?\" (e.g., abdominal pain, leaking fluid from vagina, vaginal bleeding, etc.)      Did have sharp pain in belly the there morning.       Has been sick last few days- sore throat, body aches. Unsure if she has a fever.    Protocols used: Pregnancy - Decreased Fetal Movement-ADULT-AH    "

## 2025-02-18 ENCOUNTER — CLINICAL SUPPORT (OUTPATIENT)
Age: 35
End: 2025-02-18

## 2025-02-18 DIAGNOSIS — Z32.2 ENCOUNTER FOR CHILDBIRTH INSTRUCTION: Primary | ICD-10-CM

## 2025-02-19 ENCOUNTER — ROUTINE PRENATAL (OUTPATIENT)
Dept: OBGYN CLINIC | Facility: CLINIC | Age: 35
End: 2025-02-19
Payer: COMMERCIAL

## 2025-02-19 VITALS
HEIGHT: 69 IN | BODY MASS INDEX: 25.27 KG/M2 | DIASTOLIC BLOOD PRESSURE: 58 MMHG | SYSTOLIC BLOOD PRESSURE: 102 MMHG | WEIGHT: 170.6 LBS

## 2025-02-19 DIAGNOSIS — F19.91 HISTORY OF DRUG USE: ICD-10-CM

## 2025-02-19 DIAGNOSIS — O99.332 PREGNANCY COMPLICATED BY TOBACCO USE IN SECOND TRIMESTER: ICD-10-CM

## 2025-02-19 DIAGNOSIS — O09.522 MULTIGRAVIDA OF ADVANCED MATERNAL AGE IN SECOND TRIMESTER: Primary | ICD-10-CM

## 2025-02-19 DIAGNOSIS — Z3A.24 24 WEEKS GESTATION OF PREGNANCY: ICD-10-CM

## 2025-02-19 DIAGNOSIS — Z36.89 ENCOUNTER FOR OTHER SPECIFIED ANTENATAL SCREENING: ICD-10-CM

## 2025-02-19 DIAGNOSIS — O99.820 GROUP B STREPTOCOCCAL CARRIAGE COMPLICATING PREGNANCY: ICD-10-CM

## 2025-02-19 PROBLEM — O99.330 TOBACCO USE COMPLICATING PREGNANCY: Status: ACTIVE | Noted: 2025-01-31

## 2025-02-19 PROBLEM — O09.529 MATERNAL AGE > 35, MULTIGRAVIDA: Status: ACTIVE | Noted: 2025-01-06

## 2025-02-19 LAB
SL AMB  POCT GLUCOSE, UA: NORMAL
SL AMB POCT URINE PROTEIN: NORMAL

## 2025-02-19 PROCEDURE — 99213 OFFICE O/P EST LOW 20 MIN: CPT | Performed by: STUDENT IN AN ORGANIZED HEALTH CARE EDUCATION/TRAINING PROGRAM

## 2025-02-19 PROCEDURE — 81002 URINALYSIS NONAUTO W/O SCOPE: CPT | Performed by: STUDENT IN AN ORGANIZED HEALTH CARE EDUCATION/TRAINING PROGRAM

## 2025-02-19 NOTE — ASSESSMENT & PLAN NOTE
- Will treat as GBS positive in labor, given <10k CFU GBS noted on urine culture with initial prenatal panel.

## 2025-02-19 NOTE — PROGRESS NOTES
"Routine Prenatal Visit  [facility] - [place] [address], [address], [place] [address]  Assessment & Plan  Multigravida of advanced maternal age in second trimester  - Contine  mg PO daily until 36 weeks for pre-eclampsia risk reduction in the setting of AMA and tobacco use.       Pregnancy complicated by tobacco use in second trimester  - Currently smoking e-cigarettes occasionally. Patient encouraged to continue efforts toward complete cessation.        Group B streptococcal bacteriuria complicating pregnancy  - Will treat as GBS positive in labor, given <10k CFU GBS noted on urine culture with initial prenatal panel.        24 weeks gestation of pregnancy  - PTL/PPROM/Bleeding precautions given.   - MFM consult report from level II ultrasound reviewed. Repeat US is scheduled in 3rd trimester, per [facility] recommendations.  - 28 week labs ordered, lab requisition provided to patient.  - Problem list updated, results console reviewed and updated with pertinent prenatal labs.  - PMH, PSH, medications reviewed and updated as needed.  - Return to office in 4 wk(s) for routine prenatal care  Orders:  •  POCT urine dip    Encounter for other specified  screening    Orders:  •  Glucose, 1H PG; Future  •  CBC; Future  •  RPR-Syphilis Screening (Total Syphilis IGG/IGM); Future    Subjective:   Thea Fay is a 34 y.o.  who presents for routine prenatal care at 24w2d.  Complaints today: None  LOF: No; VB: No; Contractions: No; FM: Present    Objective:  /58 (BP Location: Left arm, Patient Position: Sitting, Cuff Size: Standard)   Ht 5' 9\" (1.753 m)   Wt 77.4 kg (170 lb 9.6 oz)   LMP 2024   BMI 25.19 kg/m²     General: Well appearing, no distress  Respiratory: Unlabored breathing  Cardiovascular: Regular rate.  Abdomen: Soft, gravid, nontender  Extremities: Warm and well perfused.  Non tender.    Pregravid Weight/BMI: 59.9 kg (132 lb) (BMI 19.48)  Current Weight: 77.4 kg " (170 lb 9.6 oz)   Total Weight Gain: 17.5 kg (38 lb 9.6 oz)     Pre- Vitals    Flowsheet Row Most Recent Value   Prenatal Assessment    Fetal Heart Rate 135   Fundal Height (cm) 24 cm   Movement Present   Prenatal Vitals    Blood Pressure 102/58   Weight - Scale 77.4 kg (170 lb 9.6 oz)   Urine Albumin/Glucose    Dilation/Effacement/Station    Vaginal Drainage    Draining Fluid No   Edema    LLE Edema None   RLE Edema None   Facial Edema None           Sid Perez MD  2025 4:46 PM

## 2025-02-19 NOTE — ASSESSMENT & PLAN NOTE
- Contine  mg PO daily until 36 weeks for pre-eclampsia risk reduction in the setting of AMA and tobacco use.

## 2025-02-19 NOTE — ASSESSMENT & PLAN NOTE
- PTL/PPROM/Bleeding precautions given.   - MFM consult report from level II ultrasound reviewed. Repeat US is scheduled in 3rd trimester, per [facility] recommendations.  - 28 week labs ordered, lab requisition provided to patient.  - Problem list updated, results console reviewed and updated with pertinent prenatal labs.  - PMH, PSH, medications reviewed and updated as needed.  - Return to office in 4 wk(s) for routine prenatal care  Orders:  •  POCT urine dip

## 2025-02-19 NOTE — ASSESSMENT & PLAN NOTE
- Currently smoking e-cigarettes occasionally. Patient encouraged to continue efforts toward complete cessation.

## 2025-03-18 ENCOUNTER — ROUTINE PRENATAL (OUTPATIENT)
Dept: OBGYN CLINIC | Facility: CLINIC | Age: 35
End: 2025-03-18
Payer: COMMERCIAL

## 2025-03-18 VITALS
WEIGHT: 175 LBS | SYSTOLIC BLOOD PRESSURE: 112 MMHG | HEIGHT: 69 IN | BODY MASS INDEX: 25.92 KG/M2 | DIASTOLIC BLOOD PRESSURE: 58 MMHG

## 2025-03-18 DIAGNOSIS — O99.333 PREGNANCY COMPLICATED BY TOBACCO USE IN THIRD TRIMESTER: ICD-10-CM

## 2025-03-18 DIAGNOSIS — O09.523 MULTIGRAVIDA OF ADVANCED MATERNAL AGE IN THIRD TRIMESTER: Primary | ICD-10-CM

## 2025-03-18 DIAGNOSIS — Z3A.28 28 WEEKS GESTATION OF PREGNANCY: ICD-10-CM

## 2025-03-18 DIAGNOSIS — O99.820 GROUP B STREPTOCOCCAL CARRIAGE COMPLICATING PREGNANCY: ICD-10-CM

## 2025-03-18 DIAGNOSIS — Z23 NEED FOR TDAP VACCINATION: ICD-10-CM

## 2025-03-18 PROBLEM — O36.8120 DECREASED FETAL MOVEMENTS IN SECOND TRIMESTER: Status: RESOLVED | Noted: 2025-02-15 | Resolved: 2025-03-18

## 2025-03-18 LAB
SL AMB  POCT GLUCOSE, UA: NORMAL
SL AMB POCT URINE PROTEIN: NORMAL

## 2025-03-18 PROCEDURE — 90715 TDAP VACCINE 7 YRS/> IM: CPT | Performed by: OBSTETRICS & GYNECOLOGY

## 2025-03-18 PROCEDURE — 90471 IMMUNIZATION ADMIN: CPT | Performed by: OBSTETRICS & GYNECOLOGY

## 2025-03-18 PROCEDURE — 99213 OFFICE O/P EST LOW 20 MIN: CPT | Performed by: OBSTETRICS & GYNECOLOGY

## 2025-03-18 PROCEDURE — 81002 URINALYSIS NONAUTO W/O SCOPE: CPT | Performed by: OBSTETRICS & GYNECOLOGY

## 2025-03-18 NOTE — PROGRESS NOTES
"Routine Prenatal Visit  [facility] - [address], [address], [place] [address]    Assessment/Plan:  Thea is a 34 y.o. year old  at 28w1d who presents for routine prenatal visit.     1. Multigravida of advanced maternal age in third trimester  2. Group B streptococcal bacteriuria complicating pregnancy  3. Pregnancy complicated by tobacco use in third trimester  Assessment & Plan:  No current use of nicotine products    4. 28 weeks gestation of pregnancy  Assessment & Plan:  TDAP today. Going for labs tomorrow    Orders:  -     POCT urine dip  5. Need for Tdap vaccination  -     TDAP VACCINE GREATER THAN OR EQUAL TO 8YO IM        Subjective:     CC: Prenatal care    Thae Fay is a 34 y.o.  female who presents for routine prenatal care at 28w1d.  Pregnancy ROS: no leakage of fluid, pelvic pain, or vaginal bleeding.  normal fetal movement.    The following portions of the patient's history were reviewed and updated as appropriate: allergies, current medications, past family history, past medical history, obstetric history, gynecologic history, past social history, past surgical history and problem list.      Objective:  /58 (BP Location: Right arm, Patient Position: Sitting, Cuff Size: Standard)   Ht 5' 9\" (1.753 m)   Wt 79.4 kg (175 lb)   LMP 2024   BMI 25.84 kg/m²   Pregravid Weight/BMI: 59.9 kg (132 lb) (BMI 19.48)  Current Weight: 79.4 kg (175 lb)   Total Weight Gain: 19.5 kg (43 lb)   Pre- Vitals      Flowsheet Row Most Recent Value   Prenatal Assessment    Fetal Heart Rate 140   Fundal Height (cm) 28 cm   Movement Present   Presentation Vertex   Prenatal Vitals    Blood Pressure 112/58   Weight - Scale 79.4 kg (175 lb)   Urine Albumin/Glucose    Dilation/Effacement/Station    Vaginal Drainage    Edema              General: Well appearing, no distress  Respiratory: Unlabored breathing  Cardiovascular: Regular rate.  Abdomen: Soft, gravid, " nontender  Fundal Height: Appropriate for gestational age.  Extremities: Warm and well perfused.  Non tender.

## 2025-03-31 ENCOUNTER — APPOINTMENT (OUTPATIENT)
Dept: LAB | Facility: HOSPITAL | Age: 35
End: 2025-03-31
Payer: COMMERCIAL

## 2025-03-31 ENCOUNTER — TELEPHONE (OUTPATIENT)
Age: 35
End: 2025-03-31

## 2025-03-31 DIAGNOSIS — Z36.89 ENCOUNTER FOR OTHER SPECIFIED ANTENATAL SCREENING: ICD-10-CM

## 2025-03-31 LAB
ERYTHROCYTE [DISTWIDTH] IN BLOOD BY AUTOMATED COUNT: 13 % (ref 11.6–15.1)
EXTERNAL SYPHILIS TOTAL IGG/IGM SCREENING: NORMAL
GLUCOSE 1H P 50 G GLC PO SERPL-MCNC: 79 MG/DL (ref 70–134)
HCT VFR BLD AUTO: 33.1 % (ref 34.8–46.1)
HGB BLD-MCNC: 11 G/DL (ref 11.5–15.4)
MCH RBC QN AUTO: 31.7 PG (ref 26.8–34.3)
MCHC RBC AUTO-ENTMCNC: 33.2 G/DL (ref 31.4–37.4)
MCV RBC AUTO: 95 FL (ref 82–98)
PLATELET # BLD AUTO: 228 THOUSANDS/UL (ref 149–390)
PMV BLD AUTO: 10.3 FL (ref 8.9–12.7)
RBC # BLD AUTO: 3.47 MILLION/UL (ref 3.81–5.12)
WBC # BLD AUTO: 13.65 THOUSAND/UL (ref 4.31–10.16)

## 2025-03-31 PROCEDURE — 36415 COLL VENOUS BLD VENIPUNCTURE: CPT

## 2025-03-31 PROCEDURE — 82950 GLUCOSE TEST: CPT

## 2025-03-31 PROCEDURE — 85027 COMPLETE CBC AUTOMATED: CPT

## 2025-03-31 PROCEDURE — 86780 TREPONEMA PALLIDUM: CPT

## 2025-03-31 NOTE — TELEPHONE ENCOUNTER
Patient called to ask if she is still able to go for her 3rd tri labs, and glucose test even though she is past 28 weeks. Informed patient, yes, she should go asap. Is going to go today.

## 2025-04-01 ENCOUNTER — RESULTS FOLLOW-UP (OUTPATIENT)
Dept: LABOR AND DELIVERY | Facility: HOSPITAL | Age: 35
End: 2025-04-01

## 2025-04-01 PROBLEM — Z3A.30 30 WEEKS GESTATION OF PREGNANCY: Status: ACTIVE | Noted: 2025-01-31

## 2025-04-01 LAB — TREPONEMA PALLIDUM IGG+IGM AB [PRESENCE] IN SERUM OR PLASMA BY IMMUNOASSAY: NORMAL

## 2025-04-03 ENCOUNTER — ROUTINE PRENATAL (OUTPATIENT)
Dept: OBGYN CLINIC | Facility: CLINIC | Age: 35
End: 2025-04-03
Payer: COMMERCIAL

## 2025-04-03 VITALS
WEIGHT: 184 LBS | DIASTOLIC BLOOD PRESSURE: 58 MMHG | BODY MASS INDEX: 27.25 KG/M2 | SYSTOLIC BLOOD PRESSURE: 110 MMHG | HEIGHT: 69 IN

## 2025-04-03 DIAGNOSIS — O99.333 PREGNANCY COMPLICATED BY TOBACCO USE IN THIRD TRIMESTER: ICD-10-CM

## 2025-04-03 DIAGNOSIS — Z3A.30 30 WEEKS GESTATION OF PREGNANCY: ICD-10-CM

## 2025-04-03 DIAGNOSIS — O99.820 GROUP B STREPTOCOCCAL CARRIAGE COMPLICATING PREGNANCY: ICD-10-CM

## 2025-04-03 DIAGNOSIS — O09.523 MULTIGRAVIDA OF ADVANCED MATERNAL AGE IN THIRD TRIMESTER: Primary | ICD-10-CM

## 2025-04-03 LAB
SL AMB  POCT GLUCOSE, UA: NORMAL
SL AMB POCT URINE PROTEIN: NORMAL

## 2025-04-03 PROCEDURE — 99213 OFFICE O/P EST LOW 20 MIN: CPT | Performed by: STUDENT IN AN ORGANIZED HEALTH CARE EDUCATION/TRAINING PROGRAM

## 2025-04-03 PROCEDURE — 81002 URINALYSIS NONAUTO W/O SCOPE: CPT | Performed by: STUDENT IN AN ORGANIZED HEALTH CARE EDUCATION/TRAINING PROGRAM

## 2025-04-03 NOTE — ASSESSMENT & PLAN NOTE
- PTL/PPROM/Bleeding precautions given. Kick counts reviewed  - Third trimester labs reviewed.  - TDaP administered 3/18/2025.  - Problem list updated, results console reviewed and updated with pertinent prenatal labs.  - PMH, PSH, medications reviewed and updated as needed  - Return to office in 2 wk(s) for routine prenatal care    Orders:  •  POCT urine dip

## 2025-04-03 NOTE — ASSESSMENT & PLAN NOTE
- Continue  mg PO daily until 36 weeks for pre-eclampsia risk reduction.   - Growth ultrasound scheduled with [facility] 4/18/2025.

## 2025-04-03 NOTE — PROGRESS NOTES
"Routine Prenatal Visit  [facility] - [place]  [address] [place] [address]  Assessment & Plan  Multigravida of advanced maternal age in third trimester  - Continue  mg PO daily until 36 weeks for pre-eclampsia risk reduction.   - Growth ultrasound scheduled with [facility] 2025.       Group B streptococcal bacteriuria complicating pregnancy  - Plan for intrapartum prophylaxis with penicillin       Pregnancy complicated by tobacco use in third trimester         30 weeks gestation of pregnancy  - PTL/PPROM/Bleeding precautions given. Kick counts reviewed  - Third trimester labs reviewed.  - TDaP administered 3/18/2025.  - Problem list updated, results console reviewed and updated with pertinent prenatal labs.  - PMH, PSH, medications reviewed and updated as needed  - Return to office in 2 wk(s) for routine prenatal care    Orders:  •  POCT urine dip    Subjective:   Thea Fay is a 34 y.o.  who presents for routine prenatal care at 30w3d.  Complaints today: None  LOF: No; VB: NO; Contractions: No; FM: Present and normal    Objective:  /58 (BP Location: Right arm, Patient Position: Sitting, Cuff Size: Standard)   Ht 5' 9\" (1.753 m)   Wt 83.5 kg (184 lb)   LMP 2024   BMI 27.17 kg/m²     General: Well appearing, no distress  Respiratory: Unlabored breathing  Cardiovascular: Regular rate.  Abdomen: Soft, gravid, nontender  Extremities: Warm and well perfused.  Non tender.    Pregravid Weight/BMI: 59.9 kg (132 lb) (BMI 19.48)  Current Weight: 83.5 kg (184 lb)   Total Weight Gain: 23.6 kg (52 lb)     Pre- Vitals    Flowsheet Row Most Recent Value   Prenatal Assessment    Fetal Heart Rate 135   Fundal Height (cm) 30 cm   Movement Present   Prenatal Vitals    Blood Pressure 110/58   Weight - Scale 83.5 kg (184 lb)   Urine Albumin/Glucose    Dilation/Effacement/Station    Vaginal Drainage    Draining Fluid No   Edema    LLE Edema None   RLE Edema None   Facial Edema None "           Sid Perez MD  4/3/2025 9:13 AM

## 2025-04-11 ENCOUNTER — TELEPHONE (OUTPATIENT)
Age: 35
End: 2025-04-11

## 2025-04-14 NOTE — TELEPHONE ENCOUNTER
Attempted to contact patient for 3rd Trimester Check-in Call. Pt unavailable. [employer] msg was sent  4/11/25.  Left msg to reach out w/questions or concerns.

## 2025-04-15 PROBLEM — Z3A.32 32 WEEKS GESTATION OF PREGNANCY: Status: ACTIVE | Noted: 2025-01-31

## 2025-04-17 ENCOUNTER — ROUTINE PRENATAL (OUTPATIENT)
Dept: OBGYN CLINIC | Facility: CLINIC | Age: 35
End: 2025-04-17
Payer: COMMERCIAL

## 2025-04-17 VITALS — WEIGHT: 182.2 LBS | BODY MASS INDEX: 26.91 KG/M2 | DIASTOLIC BLOOD PRESSURE: 62 MMHG | SYSTOLIC BLOOD PRESSURE: 94 MMHG

## 2025-04-17 DIAGNOSIS — O99.333 PREGNANCY COMPLICATED BY TOBACCO USE IN THIRD TRIMESTER: ICD-10-CM

## 2025-04-17 DIAGNOSIS — Z3A.32 32 WEEKS GESTATION OF PREGNANCY: ICD-10-CM

## 2025-04-17 DIAGNOSIS — O09.523 MULTIGRAVIDA OF ADVANCED MATERNAL AGE IN THIRD TRIMESTER: Primary | ICD-10-CM

## 2025-04-17 DIAGNOSIS — O99.820 GROUP B STREPTOCOCCAL CARRIAGE COMPLICATING PREGNANCY: ICD-10-CM

## 2025-04-17 LAB
SL AMB  POCT GLUCOSE, UA: NEGATIVE
SL AMB POCT URINE PROTEIN: NEGATIVE

## 2025-04-17 PROCEDURE — 99213 OFFICE O/P EST LOW 20 MIN: CPT | Performed by: PHYSICIAN ASSISTANT

## 2025-04-17 PROCEDURE — 81002 URINALYSIS NONAUTO W/O SCOPE: CPT | Performed by: PHYSICIAN ASSISTANT

## 2025-04-17 NOTE — PROGRESS NOTES
Routine Prenatal Visit  [facility] - [address], [place] [address]    Assessment/Plan:  Thea is a 34 y.o. year old  at 32w3d who presents for routine prenatal visit.     1. Multigravida of advanced maternal age in third trimester  2. Group B streptococcal bacteriuria complicating pregnancy  3. 32 weeks gestation of pregnancy  Assessment & Plan:  Has growth ultrasound scheduled tomorrow.   Continue routine prenatal care.   Return to office for ob check in 2 weeks.     Orders:  -     POCT urine dip  4. Pregnancy complicated by tobacco use in third trimester  Assessment & Plan:  Patient quit 2024.       Next OB Visit 2 weeks.    Subjective:     CC: Prenatal care    Thea Fay is a 34 y.o.  female who presents for routine prenatal care at 32w3d.  Pregnancy ROS: Good FM, No N/V, HA, cramping, VB, LOF, edema, domestic violence, or smoking. Tolerating PNV.      The following portions of the patient's history were reviewed and updated as appropriate: allergies, current medications, past family history, past medical history, obstetric history, gynecologic history, past social history, past surgical history and problem list.      Objective:  BP 94/62   Wt 82.6 kg (182 lb 3.2 oz)   LMP 2024   BMI 26.91 kg/m²   Pregravid Weight/BMI: 59.9 kg (132 lb) (BMI 19.48)  Current Weight: 82.6 kg (182 lb 3.2 oz)   Total Weight Gain: 22.8 kg (50 lb 3.2 oz)   Pre- Vitals      Flowsheet Row Most Recent Value   Prenatal Assessment    Fetal Heart Rate 142   Fundal Height (cm) 32 cm   Movement Present   Prenatal Vitals    Blood Pressure 94/62   Weight - Scale 82.6 kg (182 lb 3.2 oz)   Urine Albumin/Glucose    Dilation/Effacement/Station    Vaginal Drainage    Edema    LLE Edema None   RLE Edema None   Facial Edema None             General: Well appearing, no distress  Abdomen: Soft, gravid, nontender  Fundal Height: Appropriate for gestational age.  Extremities:  Warm and well perfused.  Non tender.

## 2025-04-17 NOTE — ASSESSMENT & PLAN NOTE
Has growth ultrasound scheduled tomorrow.   Continue routine prenatal care.   Return to office for ob check in 2 weeks.

## 2025-04-18 ENCOUNTER — ULTRASOUND (OUTPATIENT)
Dept: PERINATAL CARE | Facility: OTHER | Age: 35
End: 2025-04-18
Attending: OBSTETRICS & GYNECOLOGY
Payer: COMMERCIAL

## 2025-04-18 VITALS
WEIGHT: 183.4 LBS | HEIGHT: 69 IN | HEART RATE: 85 BPM | DIASTOLIC BLOOD PRESSURE: 72 MMHG | BODY MASS INDEX: 27.16 KG/M2 | SYSTOLIC BLOOD PRESSURE: 118 MMHG

## 2025-04-18 DIAGNOSIS — Z3A.32 32 WEEKS GESTATION OF PREGNANCY: ICD-10-CM

## 2025-04-18 DIAGNOSIS — Z36.89 ENCOUNTER FOR ULTRASOUND TO ASSESS FETAL GROWTH: ICD-10-CM

## 2025-04-18 DIAGNOSIS — O09.523 MULTIGRAVIDA OF ADVANCED MATERNAL AGE IN THIRD TRIMESTER: Primary | ICD-10-CM

## 2025-04-18 PROCEDURE — 76816 OB US FOLLOW-UP PER FETUS: CPT | Performed by: OBSTETRICS & GYNECOLOGY

## 2025-04-18 PROCEDURE — 99213 OFFICE O/P EST LOW 20 MIN: CPT | Performed by: PHYSICIAN ASSISTANT

## 2025-04-18 NOTE — LETTER
April 20, 2025     Fidel Gale MD  [address]  [address]  [facility] [address]    Patient: Thea Fay   YOB: 1990   Date of Visit: 4/18/2025       Dear Dr. Fidel Gale MD:    Thank you for referring Thea Fay to me for evaluation. Below are my notes for this consultation.    If you have questions, please do not hesitate to call me. I look forward to following your patient along with you.         Sincerely,        Johanna Pruitt MD        CC: No Recipients    Johanna Pruitt MD  4/20/2025 10:50 PM  Sign when Signing Visit  Ms. Fay is here today for evaluation of fetal weight and amniotic fluid. She is of advanced maternal age. She has been compliant with aspirin and was reminded to discontinue this medication at 36 weeks.     A viable blunt intrauterine pregnancy is seen. Estimated fetal weight and amniotic fluid are within normal limits for gestational age. No fetal anomalies are visualized on limited survey. Placenta is within normal limits.     Evaluation and Management:   The patient was counseled regarding the above findings by Tashia Raines PA-C. The limitations of ultrasound were reviewed. Split-shared decision-making between Tashia Raines PA-C and myself was utilized, with the majority of the time spent by WENDY Raines.  Medical decision making for this encounter was low.     Our recommendations are as follows:     1. We discussed the appropriate for gestational age growth at the 56th percentile. We reviewed her glucose tolerance test which was normal. She can continue pregnancy with expectant management without further ultrasounds unless an additional indication or concern arises.       Thank you very much for this kind referral and please do not hesitate to contact me with any further questions or concerns.    I reviewed the ultrasound pictures and recommended the medical decision making transcribed in the care of this patient.      Johanna  Edmond SIDHU

## 2025-04-18 NOTE — PROGRESS NOTES
Ms. Fay is here today for evaluation of fetal weight and amniotic fluid. She is of advanced maternal age. She has been compliant with aspirin and was reminded to discontinue this medication at 36 weeks.     A viable blunt intrauterine pregnancy is seen. Estimated fetal weight and amniotic fluid are within normal limits for gestational age. No fetal anomalies are visualized on limited survey. Placenta is within normal limits.     Evaluation and Management:   The patient was counseled regarding the above findings by Tashia Raines PA-C. The limitations of ultrasound were reviewed. Split-shared decision-making between Tashia Raines PA-C and myself was utilized, with the majority of the time spent by WENDY Raines.  Medical decision making for this encounter was low.     Our recommendations are as follows:     1. We discussed the appropriate for gestational age growth at the 56th percentile. We reviewed her glucose tolerance test which was normal. She can continue pregnancy with expectant management without further ultrasounds unless an additional indication or concern arises.       Thank you very much for this kind referral and please do not hesitate to contact me with any further questions or concerns.    I reviewed the ultrasound pictures and recommended the medical decision making transcribed in the care of this patient.      Johanna Pruitt M.D.

## 2025-04-20 PROBLEM — S62.366A CLOSED NONDISPLACED FRACTURE OF NECK OF FIFTH METACARPAL BONE OF RIGHT HAND: Status: RESOLVED | Noted: 2019-01-02 | Resolved: 2025-04-20

## 2025-04-30 ENCOUNTER — PATIENT MESSAGE (OUTPATIENT)
Dept: OBGYN CLINIC | Facility: CLINIC | Age: 35
End: 2025-04-30

## 2025-04-30 ENCOUNTER — ROUTINE PRENATAL (OUTPATIENT)
Dept: OBGYN CLINIC | Facility: CLINIC | Age: 35
End: 2025-04-30
Payer: COMMERCIAL

## 2025-04-30 VITALS
HEIGHT: 69 IN | WEIGHT: 186 LBS | SYSTOLIC BLOOD PRESSURE: 116 MMHG | DIASTOLIC BLOOD PRESSURE: 64 MMHG | BODY MASS INDEX: 27.55 KG/M2

## 2025-04-30 DIAGNOSIS — Z60.9 SOCIAL PROBLEM: Primary | ICD-10-CM

## 2025-04-30 DIAGNOSIS — Z3A.34 34 WEEKS GESTATION OF PREGNANCY: ICD-10-CM

## 2025-04-30 DIAGNOSIS — O09.523 MULTIGRAVIDA OF ADVANCED MATERNAL AGE IN THIRD TRIMESTER: ICD-10-CM

## 2025-04-30 DIAGNOSIS — O99.820 GROUP B STREPTOCOCCAL CARRIAGE COMPLICATING PREGNANCY: Primary | ICD-10-CM

## 2025-04-30 LAB
SL AMB  POCT GLUCOSE, UA: NORMAL
SL AMB POCT URINE PROTEIN: NORMAL

## 2025-04-30 PROCEDURE — 81002 URINALYSIS NONAUTO W/O SCOPE: CPT | Performed by: OBSTETRICS & GYNECOLOGY

## 2025-04-30 PROCEDURE — 99213 OFFICE O/P EST LOW 20 MIN: CPT | Performed by: OBSTETRICS & GYNECOLOGY

## 2025-04-30 SDOH — SOCIAL STABILITY - SOCIAL INSECURITY: PROBLEM RELATED TO SOCIAL ENVIRONMENT, UNSPECIFIED: Z60.9

## 2025-04-30 NOTE — PROGRESS NOTES
"Routine Prenatal Visit  [facility] - [address], [place] [address]    Assessment/Plan:  Thea is a 34 y.o. year old  at 34w2d who presents for routine prenatal visit.     1. Group B streptococcal bacteriuria complicating pregnancy  2. Multigravida of advanced maternal age in third trimester  3. 34 weeks gestation of pregnancy  Assessment & Plan:  EFW on growth U/S 25 - 7gm (56%). Reminded to stop ASA @ 36 weeks.  Orders:  -     POCT urine dip        Subjective:     CC: Prenatal care    Thea Fay is a 34 y.o.  female who presents for routine prenatal care at 34w2d.  Pregnancy ROS: no leakage of fluid, pelvic pain, or vaginal bleeding.  normal fetal movement.    The following portions of the patient's history were reviewed and updated as appropriate: allergies, current medications, past family history, past medical history, obstetric history, gynecologic history, past social history, past surgical history and problem list.      Objective:  /64 (BP Location: Left arm, Patient Position: Sitting, Cuff Size: Standard)   Ht 5' 9\" (1.753 m)   Wt 84.4 kg (186 lb)   LMP 2024   BMI 27.47 kg/m²   Pregravid Weight/BMI: 59.9 kg (132 lb) (BMI 19.48)  Current Weight: 84.4 kg (186 lb)   Total Weight Gain: 24.5 kg (54 lb)   Pre- Vitals      Flowsheet Row Most Recent Value   Prenatal Assessment    Fetal Heart Rate 140   Fundal Height (cm) 35 cm   Movement Present   Presentation Vertex   Prenatal Vitals    Blood Pressure 116/64   Weight - Scale 84.4 kg (186 lb)   Urine Albumin/Glucose    Dilation/Effacement/Station    Vaginal Drainage    Edema              General: Well appearing, no distress  Respiratory: Unlabored breathing  Cardiovascular: Regular rate.  Abdomen: Soft, gravid, nontender  Fundal Height: Appropriate for gestational age.  Extremities: Warm and well perfused.  Non tender.  "

## 2025-05-01 ENCOUNTER — PATIENT OUTREACH (OUTPATIENT)
Dept: OBGYN CLINIC | Facility: CLINIC | Age: 35
End: 2025-05-01

## 2025-05-01 NOTE — PROGRESS NOTES
CHIQUI referred for utility assistance. CHIQUI received message from Dr. Perez - patient reached out via [employer] stating [employer] shut off her electric. Requested a letter confirming pregnancy - hopeful it will be a qualifying medical condition to turn power back on. CHIQUI referred 2x earlier this pregnancy for financial concerns, SDOH, & loss of employment (11/22/24 & 1/16/25). Patient is [id number] with an MARISELA of 6/9/25.     CHIQUI called patient to complete assessment & introduce role - no answer. Left VM requesting a call back - also sent [employer] message to patient requesting a return call and letting her know that CHIQUI faxed letter to [employer].

## 2025-05-02 ENCOUNTER — PATIENT OUTREACH (OUTPATIENT)
Dept: OBGYN CLINIC | Facility: CLINIC | Age: 35
End: 2025-05-02

## 2025-05-02 NOTE — PROGRESS NOTES
SW has not received call back from patient about electric shut off. Per chart review, she has not yet read [employer] messages from CHIQUI or Dr. Perez regarding proof of pregnancy letter. CHIQUI tried calling patient again today - no answer. Left additional VM requesting a call back.    Called [employer] to confirm fax was received yesterday, and inquire if electric has been turned back on. Automated message stated that wait time for a representative would be 22 mins. Connected to representative Favian after 1 hour wait time. Representative confirmed that the medical certification was received. Patient's electric was turned back on yesterday afternoon. Medical extension is only valid for 30 days - patient will need to submit payment in order to continue receiving electricity past that date.

## 2025-05-09 ENCOUNTER — PATIENT OUTREACH (OUTPATIENT)
Dept: OBGYN CLINIC | Facility: CLINIC | Age: 35
End: 2025-05-09

## 2025-05-09 NOTE — LETTER
May 9, 2025    Thea Fay  [address]  [facility] [address]        Prosper Sidhu,      I hope you are doing well. I am reaching out because I have made a few attempts to contact you by phone/ESILLAGEhart, but have been unable to reach you! I apologize if I have been calling at a bad time.       If you are interested in social work services or resources, please contact me at [phone] so that I can assist. My normal hours are Monday through Friday, 8:00am to 4:00/4:30pm.      Take care,      Cyndy Briggs LMSW

## 2025-05-09 NOTE — PROGRESS NOTES
Patient did not return call following VM left on 5/1 & 5/2. Has not yet not read or replied to [employer] message sent on 5/1. As previously documented, [employer] confirmed patient's electric has been turned back on for 30 days. SW also received ESC from  Joao stating that patient submitted a request for BCT [employer] form to be completed by office.     Third call attempt made today to offer assistance/resources - no answer. Left additional VM requesting a call back. Next appt scheduled for 5/14/25 with Dr. Perez.     Due to lack of response from patient, SW sent UTR letter via [employer]. SW closing referral at this time, please re-refer as needed.

## 2025-05-14 ENCOUNTER — ROUTINE PRENATAL (OUTPATIENT)
Dept: OBGYN CLINIC | Facility: CLINIC | Age: 35
End: 2025-05-14
Payer: COMMERCIAL

## 2025-05-14 VITALS
BODY MASS INDEX: 28.29 KG/M2 | SYSTOLIC BLOOD PRESSURE: 114 MMHG | DIASTOLIC BLOOD PRESSURE: 62 MMHG | HEIGHT: 69 IN | WEIGHT: 191 LBS

## 2025-05-14 DIAGNOSIS — Z36.85 ANTENATAL SCREENING FOR STREPTOCOCCUS B: ICD-10-CM

## 2025-05-14 DIAGNOSIS — O09.523 MULTIGRAVIDA OF ADVANCED MATERNAL AGE IN THIRD TRIMESTER: Primary | ICD-10-CM

## 2025-05-14 DIAGNOSIS — Z3A.36 36 WEEKS GESTATION OF PREGNANCY: ICD-10-CM

## 2025-05-14 DIAGNOSIS — O99.820 GROUP B STREPTOCOCCAL CARRIAGE COMPLICATING PREGNANCY: ICD-10-CM

## 2025-05-14 LAB
SL AMB  POCT GLUCOSE, UA: NORMAL
SL AMB POCT URINE PROTEIN: NORMAL

## 2025-05-14 PROCEDURE — 99213 OFFICE O/P EST LOW 20 MIN: CPT | Performed by: STUDENT IN AN ORGANIZED HEALTH CARE EDUCATION/TRAINING PROGRAM

## 2025-05-14 PROCEDURE — 81002 URINALYSIS NONAUTO W/O SCOPE: CPT | Performed by: STUDENT IN AN ORGANIZED HEALTH CARE EDUCATION/TRAINING PROGRAM

## 2025-05-14 NOTE — PROGRESS NOTES
"Routine Prenatal Visit  [facility] - [place] [address], [address], [place] [address]  Assessment & Plan  Multigravida of advanced maternal age in third trimester         Group B streptococcal bacteriuria complicating pregnancy         36 weeks gestation of pregnancy  - Labor/Bleeding/ROM precautions given. Kick counts reviewed.  - Delivery consent reviewed and signed today. Risks of delivery reviewed, including bleeding, infection, damage to surrounding organs/structures (specifically bowel, bladder, vessels, nerves, ureters), need for operative vaginal delivery or  delivery, hysterectomy, need for blood transfusion, need for further surgery.   - Problem list updated, results console reviewed and updated with pertinent prenatal labs.  - PMH, PSH, medications reviewed and updated as needed  - Return to office in 1 wk(s) for routine prenatal care  Orders:  •  POCT urine dip    Subjective:   Thea Fay is a 34 y.o.  who presents for routine prenatal care at 36w2d.  Complaints today: None  LOF: No; VB: No; Contractions: No; FM: Present and normal    Objective:  /62 (BP Location: Left arm, Patient Position: Sitting, Cuff Size: Standard)   Ht 5' 9\" (1.753 m)   Wt 86.6 kg (191 lb)   LMP 2024   BMI 28.21 kg/m²     General: Well appearing, no distress  Respiratory: Unlabored breathing  Cardiovascular: Regular rate.  Abdomen: Soft, gravid, nontender  Extremities: Warm and well perfused.  Non tender.    Pregravid Weight/BMI: 59.9 kg (132 lb) (BMI 19.48)  Current Weight: 86.6 kg (191 lb)   Total Weight Gain: 26.8 kg (59 lb)     Pre- Vitals    Flowsheet Row Most Recent Value   Prenatal Assessment    Fetal Heart Rate 130   Fundal Height (cm) 36 cm   Movement Present   Presentation Vertex   Prenatal Vitals    Blood Pressure 114/62   Weight - Scale 86.6 kg (191 lb)   Urine Albumin/Glucose    Dilation/Effacement/Station    Vaginal Drainage    Draining Fluid No   Edema  "   LLE Edema None   RLE Edema None   Facial Edema None           Sid Perez MD  5/14/2025 4:44 PM

## 2025-05-14 NOTE — ASSESSMENT & PLAN NOTE
- Labor/Bleeding/ROM precautions given. Kick counts reviewed***.  - Delivery consent reviewed and signed today***. Risks of delivery reviewed, including bleeding, infection, damage to surrounding organs/structures (specifically bowel, bladder, vessels, nerves, ureters), need for*** operative vaginal delivery or  delivery***, hysterectomy, need for blood transfusion, need for further surgery.   - Problem list updated***, results console reviewed and updated with pertinent prenatal labs***.  - PMH, PSH, medications reviewed and updated as needed  - Return to office in ***wk(s) for routine prenatal care    Orders:  •  POCT urine dip

## 2025-05-14 NOTE — PROGRESS NOTES
"Name: Thea Fay      : 1990      MRN: [id number]  Encounter Provider: Sid Perez MD  Encounter Date: 2025   Encounter department: [facility] [place]  :  Assessment & Plan  Multigravida of advanced maternal age in third trimester         Group B streptococcal bacteriuria complicating pregnancy         36 weeks gestation of pregnancy  - Labor/Bleeding/ROM precautions given. Kick counts reviewed***.  - Delivery consent reviewed and signed today***. Risks of delivery reviewed, including bleeding, infection, damage to surrounding organs/structures (specifically bowel, bladder, vessels, nerves, ureters), need for*** operative vaginal delivery or  delivery***, hysterectomy, need for blood transfusion, need for further surgery.   - Problem list updated***, results console reviewed and updated with pertinent prenatal labs***.  - PMH, PSH, medications reviewed and updated as needed  - Return to office in ***wk(s) for routine prenatal care    Orders:  •  POCT urine dip     screening for streptococcus B    Orders:  •  Strep B DNA probe, amplification        History of Present Illness {?Quick Links Encounters * My Last Note * Last Note in Specialty * Snapshot * Since Last Visit * History :89035}  HPI  Thea Fay is a 34 y.o. female who presents ***  {History obtained from(Optional):16921}    Review of Systems  {Select to insert medical history sections (Optional):41852}     Objective {?Quick Links Trend Vitals * Enter New Vitals * Results Review * Timeline (Adult) * Labs * Imaging * Cardiology * Procedures * Lung Cancer Screening * Surgical eConsent :30844}  /62 (BP Location: Left arm, Patient Position: Sitting, Cuff Size: Standard)   Ht 5' 9\" (1.753 m)   Wt 86.6 kg (191 lb)   LMP 2024   BMI 28.21 kg/m²      Physical Exam    {Administrative / Billing Section (Optional):54068}  "

## 2025-05-14 NOTE — ASSESSMENT & PLAN NOTE
- Labor/Bleeding/ROM precautions given. Kick counts reviewed.  - Delivery consent reviewed and signed today. Risks of delivery reviewed, including bleeding, infection, damage to surrounding organs/structures (specifically bowel, bladder, vessels, nerves, ureters), need for operative vaginal delivery or  delivery, hysterectomy, need for blood transfusion, need for further surgery.   - Problem list updated, results console reviewed and updated with pertinent prenatal labs.  - PMH, PSH, medications reviewed and updated as needed  - Return to office in 1 wk(s) for routine prenatal care  Orders:  •  POCT urine dip

## 2025-05-22 ENCOUNTER — ROUTINE PRENATAL (OUTPATIENT)
Dept: OBGYN CLINIC | Facility: CLINIC | Age: 35
End: 2025-05-22
Payer: COMMERCIAL

## 2025-05-22 VITALS
SYSTOLIC BLOOD PRESSURE: 128 MMHG | WEIGHT: 195 LBS | HEIGHT: 68 IN | DIASTOLIC BLOOD PRESSURE: 58 MMHG | BODY MASS INDEX: 29.55 KG/M2

## 2025-05-22 DIAGNOSIS — F19.91 HISTORY OF DRUG USE: ICD-10-CM

## 2025-05-22 DIAGNOSIS — O09.523 MULTIGRAVIDA OF ADVANCED MATERNAL AGE IN THIRD TRIMESTER: ICD-10-CM

## 2025-05-22 DIAGNOSIS — Z3A.37 37 WEEKS GESTATION OF PREGNANCY: Primary | ICD-10-CM

## 2025-05-22 DIAGNOSIS — O99.820 GROUP B STREPTOCOCCAL CARRIAGE COMPLICATING PREGNANCY: ICD-10-CM

## 2025-05-22 LAB
SL AMB  POCT GLUCOSE, UA: NEGATIVE
SL AMB POCT URINE PROTEIN: NEGATIVE

## 2025-05-22 PROCEDURE — 81002 URINALYSIS NONAUTO W/O SCOPE: CPT | Performed by: OBSTETRICS & GYNECOLOGY

## 2025-05-22 PROCEDURE — 99213 OFFICE O/P EST LOW 20 MIN: CPT | Performed by: OBSTETRICS & GYNECOLOGY

## 2025-05-22 NOTE — ASSESSMENT & PLAN NOTE
<10k CFU GBS noted on urine culture with initial prenatal panel. Will treat as GBS positive in labor.

## 2025-05-22 NOTE — PROGRESS NOTES
"Routine Prenatal Visit  [facility] - [address], [address], [place] [address]    Assessment/Plan:  Thea is a 34 y.o. year old  at 37w3d who presents for routine prenatal visit.     Assessment & Plan  Multigravida of advanced maternal age in third trimester         Group B streptococcal bacteriuria complicating pregnancy    <10k CFU GBS noted on urine culture with initial prenatal panel. Will treat as GBS positive in labor.        37 weeks gestation of pregnancy    Orders:    POCT urine dip    History of drug use    History of methamphetamine use. Last use 2 years prior to pregnancy.          Next OB Visit 1 weeks.    Subjective:     CC: Prenatal care    Thea Fay is a 34 y.o.  female who presents for routine prenatal care at 37w3d.  Pregnancy ROS: no leakage of fluid, pelvic pain, or vaginal bleeding.  normal fetal movement.    The following portions of the patient's history were reviewed and updated as appropriate: allergies, current medications, past family history, past medical history, obstetric history, gynecologic history, past social history, past surgical history and problem list.      Objective:  /58 (BP Location: Right arm, Patient Position: Sitting, Cuff Size: Standard)   Ht 5' 8\" (1.727 m)   Wt 88.5 kg (195 lb)   LMP 2024   BMI 29.65 kg/m²   Pregravid Weight/BMI: 59.9 kg (132 lb) (BMI 20.08)  Current Weight: 88.5 kg (195 lb)   Total Weight Gain: 28.6 kg (63 lb)   Pre- Vitals      Flowsheet Row Most Recent Value   Prenatal Assessment    Prenatal Vitals    Blood Pressure 128/58   Weight - Scale 88.5 kg (195 lb)   Urine Albumin/Glucose    Dilation/Effacement/Station    Vaginal Drainage    Edema              General: Well appearing, no distress  Abdomen: Soft, gravid, nontender  Extremities: Non tender.  " tylenol ingestion

## 2025-05-27 ENCOUNTER — HOSPITAL ENCOUNTER (OUTPATIENT)
Facility: HOSPITAL | Age: 35
Discharge: HOME | End: 2025-05-27
Attending: EMERGENCY MEDICINE | Admitting: OBSTETRICS & GYNECOLOGY
Payer: COMMERCIAL

## 2025-05-27 ENCOUNTER — NURSE TRIAGE (OUTPATIENT)
Dept: OTHER | Facility: OTHER | Age: 35
End: 2025-05-27

## 2025-05-27 VITALS
HEART RATE: 86 BPM | SYSTOLIC BLOOD PRESSURE: 123 MMHG | TEMPERATURE: 97.8 F | DIASTOLIC BLOOD PRESSURE: 69 MMHG | RESPIRATION RATE: 20 BRPM | OXYGEN SATURATION: 98 %

## 2025-05-27 DIAGNOSIS — R05.9 COUGH: ICD-10-CM

## 2025-05-27 DIAGNOSIS — B34.9 ACUTE VIRAL SYNDROME: ICD-10-CM

## 2025-05-27 DIAGNOSIS — R10.9 ABDOMINAL PAIN: Primary | ICD-10-CM

## 2025-05-27 PROBLEM — J06.9 URI (UPPER RESPIRATORY INFECTION): Status: ACTIVE | Noted: 2025-05-27

## 2025-05-27 LAB
ALBUMIN SERPL BCG-MCNC: 3.3 G/DL (ref 3.5–5)
ALP SERPL-CCNC: 124 U/L (ref 34–104)
ALT SERPL W P-5'-P-CCNC: 22 U/L (ref 7–52)
ANION GAP SERPL CALCULATED.3IONS-SCNC: 9 MMOL/L (ref 4–13)
ANISOCYTOSIS BLD QL SMEAR: PRESENT
AST SERPL W P-5'-P-CCNC: 20 U/L (ref 13–39)
BACTERIA UR QL AUTO: ABNORMAL /HPF
BASOPHILS # BLD MANUAL: 0 THOUSAND/UL (ref 0–0.1)
BASOPHILS NFR MAR MANUAL: 0 % (ref 0–1)
BILIRUB SERPL-MCNC: 0.33 MG/DL (ref 0.2–1)
BILIRUB UR QL STRIP: NEGATIVE
BUN SERPL-MCNC: 8 MG/DL (ref 5–25)
CALCIUM ALBUM COR SERPL-MCNC: 9.7 MG/DL (ref 8.3–10.1)
CALCIUM SERPL-MCNC: 9.1 MG/DL (ref 8.4–10.2)
CHLORIDE SERPL-SCNC: 107 MMOL/L (ref 96–108)
CLARITY UR: CLEAR
CO2 SERPL-SCNC: 20 MMOL/L (ref 21–32)
COLOR UR: YELLOW
CREAT SERPL-MCNC: 0.67 MG/DL (ref 0.6–1.3)
EOSINOPHIL # BLD MANUAL: 0.17 THOUSAND/UL (ref 0–0.4)
EOSINOPHIL NFR BLD MANUAL: 1 % (ref 0–6)
ERYTHROCYTE [DISTWIDTH] IN BLOOD BY AUTOMATED COUNT: 14 % (ref 11.6–15.1)
FLUAV RNA RESP QL NAA+PROBE: NEGATIVE
FLUBV RNA RESP QL NAA+PROBE: NEGATIVE
GFR SERPL CREATININE-BSD FRML MDRD: 114 ML/MIN/1.73SQ M
GLUCOSE SERPL-MCNC: 93 MG/DL (ref 65–140)
GLUCOSE UR STRIP-MCNC: NEGATIVE MG/DL
HCT VFR BLD AUTO: 35.3 % (ref 34.8–46.1)
HGB BLD-MCNC: 11.5 G/DL (ref 11.5–15.4)
HGB UR QL STRIP.AUTO: ABNORMAL
KETONES UR STRIP-MCNC: NEGATIVE MG/DL
LEUKOCYTE ESTERASE UR QL STRIP: ABNORMAL
LIPASE SERPL-CCNC: 12 U/L (ref 11–82)
LYMPHOCYTES # BLD AUTO: 17 % (ref 14–44)
LYMPHOCYTES # BLD AUTO: 2.84 THOUSAND/UL (ref 0.6–4.47)
MCH RBC QN AUTO: 30 PG (ref 26.8–34.3)
MCHC RBC AUTO-ENTMCNC: 32.6 G/DL (ref 31.4–37.4)
MCV RBC AUTO: 92 FL (ref 82–98)
METAMYELOCYTE ABSOLUTE CT: 0.33 THOUSAND/UL (ref 0–0.1)
METAMYELOCYTES NFR BLD MANUAL: 2 % (ref 0–1)
MONOCYTES # BLD AUTO: 1.17 THOUSAND/UL (ref 0–1.22)
MONOCYTES NFR BLD: 7 % (ref 4–12)
NEUTROPHILS # BLD MANUAL: 12.21 THOUSAND/UL (ref 1.85–7.62)
NEUTS SEG NFR BLD AUTO: 73 % (ref 43–75)
NITRITE UR QL STRIP: NEGATIVE
NON-SQ EPI CELLS URNS QL MICRO: ABNORMAL /HPF
PH UR STRIP.AUTO: 6 [PH]
PLATELET # BLD AUTO: 224 THOUSANDS/UL (ref 149–390)
PLATELET BLD QL SMEAR: ADEQUATE
PMV BLD AUTO: 10.6 FL (ref 8.9–12.7)
POTASSIUM SERPL-SCNC: 3.6 MMOL/L (ref 3.5–5.3)
PROT SERPL-MCNC: 6.3 G/DL (ref 6.4–8.4)
PROT UR STRIP-MCNC: NEGATIVE MG/DL
RBC # BLD AUTO: 3.83 MILLION/UL (ref 3.81–5.12)
RBC #/AREA URNS AUTO: ABNORMAL /HPF
RBC MORPH BLD: PRESENT
RSV RNA RESP QL NAA+PROBE: NEGATIVE
SARS-COV-2 RNA RESP QL NAA+PROBE: NEGATIVE
SODIUM SERPL-SCNC: 136 MMOL/L (ref 135–147)
SP GR UR STRIP.AUTO: 1.01 (ref 1–1.03)
UROBILINOGEN UR STRIP-ACNC: <2 MG/DL
WBC # BLD AUTO: 16.72 THOUSAND/UL (ref 4.31–10.16)
WBC #/AREA URNS AUTO: ABNORMAL /HPF

## 2025-05-27 PROCEDURE — 87086 URINE CULTURE/COLONY COUNT: CPT | Performed by: OBSTETRICS & GYNECOLOGY

## 2025-05-27 PROCEDURE — 81001 URINALYSIS AUTO W/SCOPE: CPT | Performed by: OBSTETRICS & GYNECOLOGY

## 2025-05-27 PROCEDURE — 80053 COMPREHEN METABOLIC PANEL: CPT | Performed by: OBSTETRICS & GYNECOLOGY

## 2025-05-27 PROCEDURE — 85027 COMPLETE CBC AUTOMATED: CPT | Performed by: OBSTETRICS & GYNECOLOGY

## 2025-05-27 PROCEDURE — 85007 BL SMEAR W/DIFF WBC COUNT: CPT | Performed by: OBSTETRICS & GYNECOLOGY

## 2025-05-27 PROCEDURE — 99214 OFFICE O/P EST MOD 30 MIN: CPT

## 2025-05-27 PROCEDURE — 83690 ASSAY OF LIPASE: CPT | Performed by: OBSTETRICS & GYNECOLOGY

## 2025-05-27 PROCEDURE — 99284 EMERGENCY DEPT VISIT MOD MDM: CPT

## 2025-05-27 PROCEDURE — NC001 PR NO CHARGE: Performed by: OBSTETRICS & GYNECOLOGY

## 2025-05-27 PROCEDURE — 0241U HB NFCT DS VIR RESP RNA 4 TRGT: CPT | Performed by: EMERGENCY MEDICINE

## 2025-05-27 PROCEDURE — 76815 OB US LIMITED FETUS(S): CPT | Performed by: OBSTETRICS & GYNECOLOGY

## 2025-05-27 PROCEDURE — 59025 FETAL NON-STRESS TEST: CPT | Performed by: OBSTETRICS & GYNECOLOGY

## 2025-05-27 PROCEDURE — 76818 FETAL BIOPHYS PROFILE W/NST: CPT | Performed by: OBSTETRICS & GYNECOLOGY

## 2025-05-27 PROCEDURE — 99284 EMERGENCY DEPT VISIT MOD MDM: CPT | Performed by: EMERGENCY MEDICINE

## 2025-05-27 RX ORDER — SODIUM CHLORIDE, SODIUM LACTATE, POTASSIUM CHLORIDE, CALCIUM CHLORIDE 600; 310; 30; 20 MG/100ML; MG/100ML; MG/100ML; MG/100ML
125 INJECTION, SOLUTION INTRAVENOUS CONTINUOUS
Status: DISCONTINUED | OUTPATIENT
Start: 2025-05-27 | End: 2025-05-27 | Stop reason: HOSPADM

## 2025-05-27 RX ORDER — DIPHENHYDRAMINE HYDROCHLORIDE 50 MG/ML
50 INJECTION, SOLUTION INTRAMUSCULAR; INTRAVENOUS ONCE
Status: COMPLETED | OUTPATIENT
Start: 2025-05-27 | End: 2025-05-27

## 2025-05-27 RX ORDER — ALBUTEROL SULFATE 90 UG/1
2 INHALANT RESPIRATORY (INHALATION) ONCE
Status: COMPLETED | OUTPATIENT
Start: 2025-05-27 | End: 2025-05-27

## 2025-05-27 RX ORDER — FAMOTIDINE 10 MG/ML
20 INJECTION, SOLUTION INTRAVENOUS ONCE
Status: COMPLETED | OUTPATIENT
Start: 2025-05-27 | End: 2025-05-27

## 2025-05-27 RX ORDER — CALCIUM CARBONATE 500 MG/1
500 TABLET, CHEWABLE ORAL 3 TIMES DAILY PRN
Status: DISCONTINUED | OUTPATIENT
Start: 2025-05-27 | End: 2025-05-27 | Stop reason: HOSPADM

## 2025-05-27 RX ORDER — ACETAMINOPHEN 10 MG/ML
1000 INJECTION, SOLUTION INTRAVENOUS ONCE
Status: COMPLETED | OUTPATIENT
Start: 2025-05-27 | End: 2025-05-27

## 2025-05-27 RX ORDER — GUAIFENESIN/DEXTROMETHORPHAN 100-10MG/5
5 SYRUP ORAL EVERY 4 HOURS PRN
Status: DISCONTINUED | OUTPATIENT
Start: 2025-05-27 | End: 2025-05-27 | Stop reason: HOSPADM

## 2025-05-27 RX ADMIN — ALBUTEROL SULFATE 2 PUFF: 90 AEROSOL, METERED RESPIRATORY (INHALATION) at 04:35

## 2025-05-27 RX ADMIN — SODIUM CHLORIDE, SODIUM LACTATE, POTASSIUM CHLORIDE, AND CALCIUM CHLORIDE 1000 ML: .6; .31; .03; .02 INJECTION, SOLUTION INTRAVENOUS at 06:00

## 2025-05-27 RX ADMIN — GUAIFENESIN SYRUP AND DEXTROMETHORPHAN 5 ML: 100; 10 SYRUP ORAL at 07:23

## 2025-05-27 RX ADMIN — CALCIUM CARBONATE (ANTACID) CHEW TAB 500 MG 500 MG: 500 CHEW TAB at 07:07

## 2025-05-27 RX ADMIN — ACETAMINOPHEN 1000 MG: 10 INJECTION INTRAVENOUS at 08:23

## 2025-05-27 RX ADMIN — DIPHENHYDRAMINE HYDROCHLORIDE 50 MG: 50 INJECTION, SOLUTION INTRAMUSCULAR; INTRAVENOUS at 08:23

## 2025-05-27 RX ADMIN — FAMOTIDINE 20 MG: 10 INJECTION, SOLUTION INTRAVENOUS at 11:49

## 2025-05-27 RX ADMIN — SODIUM CHLORIDE, SODIUM LACTATE, POTASSIUM CHLORIDE, AND CALCIUM CHLORIDE 125 ML/HR: .6; .31; .03; .02 INJECTION, SOLUTION INTRAVENOUS at 07:07

## 2025-05-27 NOTE — PROGRESS NOTES
"Assuming care of patient from Dr. Chowdary    35 yo  at 38+1 presented to ED with URI symptoms including cough x 4 days. Reports abdomen hurts when baby moves, but no actual painful contractions.   Seen in ED - treated with albuterol inhaler and sent to OB triage.    WBC 16.7 and URI swabs all negative, likely viral URI.    /69 (BP Location: Right arm)   Pulse 96   Temp 97.8 °F (36.6 °C) (Oral)   Resp 20   LMP 2024   SpO2 98%     General: AOx3, NAD  Lungs: CTAB  CV: RRR  Abdomen: soft, fundus nontender  Extremities: nontender without edema bilaterally  SVE: 3-4/70/-2      No signs or symptoms of cardiac or other more concerning pulmonary etiology.    Here in triage, received IV fluids and contractions spaced. Cervical exam 3-4 cm, unchanged by prior examiner and again by myself unchanged (3-4/70/-2).  Bedside US shows active fetus in cephalic position with NAHED 8.5 and incidental BPP 8/8. Overall fetal testing with BPP 10/10 given reactive NST.    No signs concerning for abruption. No vaginal bleeding, leaking of fluid.    Suspect abdominal \"soreness\" from fetal movement  is due to overall inflammation from URI.    Took tylenol at home (1000 mg) prior to arrival here.    Will give Tylenol 1000 mg V once and Benadryl 50 mg IV once and reassess.            "

## 2025-05-27 NOTE — PROGRESS NOTES
Patient more comfortable following IV tylenol and benadryl.  NST has been reactive and reassuring throughout - no signs of intra-amniotic infection; patient is afebrile and no signs of abruption. Contractions 1 in 10 minutes, normal for this gestation. Discharging home with calling guidelines. D/w Dr. Perez

## 2025-05-27 NOTE — TELEPHONE ENCOUNTER
"FOLLOW UP: Please call patient to follow up.    REASON FOR CONVERSATION: Vomiting Blood    SYMPTOMS: Cough for the last 4-5 days. Woke up with severe right lower mouth pain about 30 minute ago and started coughing so hard that she has had 4 episodes of bloody mucous emesis. Emesis completley pink tinged each time and enough volume to fill her hand. Patient denies chest pain but states feeling short of breath at rest. She has not felt baby move since before bed and has been having contractions every 15 minutes with lower back pain. Denies headaches, blurry vision, dizziness, vaginal bleeding, leaking fluid, and all other symptoms.     OTHER: Patient is 38w1d and advised her to go ER per protocol. Notified oncall OBGYN.    DISPOSITION: Go to ED Now      Reason for Disposition   Pregnant or pregnant in past month    Answer Assessment - Initial Assessment Questions  1. ONSET: \"When did the cough begin?\"         4-5 days ago    2. SEVERITY: \"How bad is the cough today?\" \"Did the blood appear after a coughing spell?\"         Coughing frequent and now blood has appeared 30 minutes ago when spitting up/throwing up.    3. SPUTUM: \"Describe the color of your sputum\" (e.g., none, dry cough; clear, white, yellow, green)        Bloody mucous.     4. HEMOPTYSIS: \"How much blood?\" (e.g., flecks, streaks, tablespoons)        Mucous is pink tinged and the volume is more then enough to fill patient's hand. Denies blood clots.     5. DIFFICULTY BREATHING: \"Are you having difficulty breathing?\" If Yes, ask: \"How bad is it?\" (e.g., mild, moderate, severe)         Shortness of breath at rest.    6. FEVER: \"Do you have a fever?\" If Yes, ask: \"What is your temperature, how was it measured, and when did it start?\"        Unsure because does not have thermometer but is sweating.     7. CARDIAC HISTORY: \"Do you have any history of heart disease?\" (e.g., heart attack, congestive heart failure)         Denies    8. LUNG HISTORY: \"Do you have " "any history of lung disease?\"  (e.g., pulmonary embolus, asthma, emphysema)        Denies    9. PE RISK FACTORS: \"Do you have a history of blood clots?\" Note: Other risk factors include recent major surgery, recent prolonged travel, being bedridden.        Denies    10. OTHER SYMPTOMS: \"Do you have any other symptoms?\" (e.g., runny nose, wheezing, chest pain)          Cough, congestion, right ear pain, right lower mouth pain, lower back pain, and contractions every 15 minutes. Has not felt baby move since before bed.     11. PREGNANCY: \"Is there any chance you are pregnant?\" \"When was your last menstrual period?\"          38w1d    Protocols used: Coughing Up Blood-Adult-    "

## 2025-05-27 NOTE — PROGRESS NOTES
Pt reported to unit after arriving in the ED with URI symptoms.  EFM WNL, LR bolus infusing per MAR, VSS, bed in lowest position and call bell within reach.

## 2025-05-27 NOTE — QUICK NOTE
Labs with increased WBC 16.7, otherwise essentially normal for pregnancy    Cervix unchanged  No ctx on monitor  Pt with very tender abdomen but no evidence of abruption    Awaiting cough syrup from pharmacy  Given tums with some relief heartburn    Continue IVF and observation
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
## 2025-05-27 NOTE — PROCEDURES
Thea Fay, a  at 38w1d with an MARISELA of 2025, by Last Menstrual Period, was seen at [facility] for the following procedure(s): $Procedure Type: NST, NAHED, BPP with NST]    Nonstress Test  Variability: Moderate  Decelerations: None  Accelerations: Yes  Baseline: 150 BPM  Uterine Irritability: No  Contractions: Irregular    4 Quadrant NAHED  NAHED Q1 (cm): 3.3 cm  NAHED Q2 (cm): 4.1 cm  NAHED Q3 (cm): 0.1 cm  NAHED Q4 (cm): 1 cm  NAHED TOTAL (cm): 8.5 cm    Biophysical Profile  Fetal Breathin  Fetal Movement: 2  Fetal Tone: 2  Fluid Volume: 2  Nonstress Test: 2  Biophysical Profile Score (of 8): 8 /  Biophysical Profile Score (of 10): 10 /10         Interpretation  Nonstress Test Interpretation: Reactive  Overall Impression: Reassuring  Comments: Active fetus in cephalic position, incidental BPP noted
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
## 2025-05-27 NOTE — PROCEDURES
Bedside US     4 Quadrant NAHED  NAHED Q1 (cm): 3.3 cm  NAHED Q2 (cm): 4.1 cm  NAHED Q3 (cm): 0.1 cm  NAHED Q4 (cm): 1 cm  NAHED TOTAL (cm): 8.5 cm    Biophysical Profile  Fetal Breathin  Fetal Movement: 2  Fetal Tone: 2  Fluid Volume: 2  Nonstress Test: 2  Biophysical Profile Score (of 8): 8 /8  Biophysical Profile Score (of 10): 10 /10         Interpretation  Nonstress Test Interpretation: Reactive  Overall Impression: Reassuring  Comments: Active fetus in cephalic position, incidental BPP noted

## 2025-05-28 ENCOUNTER — NURSE TRIAGE (OUTPATIENT)
Dept: OTHER | Facility: OTHER | Age: 35
End: 2025-05-28

## 2025-05-28 ENCOUNTER — TELEPHONE (OUTPATIENT)
Age: 35
End: 2025-05-28

## 2025-05-28 LAB — BACTERIA UR CULT: NORMAL
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
## 2025-05-28 NOTE — TELEPHONE ENCOUNTER
Spoke with patient who is 38w2d, .  She was told she could schedule induction after her appointment on Friday.  Advised patient it would be elective and would most likely still be the following week, not necessarily on Friday after her appointment.

## 2025-05-28 NOTE — TELEPHONE ENCOUNTER
Spoke w/pt in regards to scheduled IOL date/time.  Scheduled at [facility] L/D 6/2/25 at 10am.  Pt aware and agreeable.   Advised patient she may eat a light breakfast prior to going to L&D. In the interim encouraged to report any vaginal bleeding, leakage of fluid, decreased fetal movement or contractions. Reviewed fetal kick counts. Advised to keep all upcoming prenatal visits.

## 2025-05-30 ENCOUNTER — ROUTINE PRENATAL (OUTPATIENT)
Dept: OBGYN CLINIC | Facility: CLINIC | Age: 35
End: 2025-05-30
Payer: COMMERCIAL

## 2025-05-30 VITALS
WEIGHT: 196 LBS | HEIGHT: 68 IN | SYSTOLIC BLOOD PRESSURE: 100 MMHG | BODY MASS INDEX: 29.7 KG/M2 | DIASTOLIC BLOOD PRESSURE: 64 MMHG

## 2025-05-30 DIAGNOSIS — O09.523 MULTIGRAVIDA OF ADVANCED MATERNAL AGE IN THIRD TRIMESTER: Primary | ICD-10-CM

## 2025-05-30 DIAGNOSIS — Z3A.38 38 WEEKS GESTATION OF PREGNANCY: ICD-10-CM

## 2025-05-30 DIAGNOSIS — O99.820 GROUP B STREPTOCOCCAL CARRIAGE COMPLICATING PREGNANCY: ICD-10-CM

## 2025-05-30 LAB
SL AMB  POCT GLUCOSE, UA: NORMAL
SL AMB POCT URINE PROTEIN: NORMAL

## 2025-05-30 PROCEDURE — 99213 OFFICE O/P EST LOW 20 MIN: CPT | Performed by: OBSTETRICS & GYNECOLOGY

## 2025-05-30 PROCEDURE — 81002 URINALYSIS NONAUTO W/O SCOPE: CPT | Performed by: OBSTETRICS & GYNECOLOGY

## 2025-05-30 RX ORDER — ALBUTEROL SULFATE 90 UG/1
2 INHALANT RESPIRATORY (INHALATION) EVERY 6 HOURS PRN
COMMUNITY

## 2025-05-30 NOTE — PROGRESS NOTES
"Routine Prenatal Visit  [facility] [address], [address], [place] [address]    Assessment/Plan:  Thea is a 34 y.o. year old  at 38w4d who presents for routine prenatal visit.     1. Multigravida of advanced maternal age in third trimester  Assessment & Plan:  Scheduled for IOL next week  2. 38 weeks gestation of pregnancy  -     POCT urine dip  3. Group B streptococcal bacteriuria complicating pregnancy        Subjective:   Thea Fay is a 34 y.o.  who presents for routine prenatal care at 38w4d.  Complaints today: Denies  LOF: -; VB: -; Contractions: -; FM: +    Objective:  /64 (BP Location: Left arm, Patient Position: Sitting, Cuff Size: Standard)   Ht 5' 8\" (1.727 m)   Wt 88.9 kg (196 lb)   LMP 2024   BMI 29.80 kg/m²     General: Well appearing, no distress  Respiratory: Unlabored breathing  Abdomen: Soft, gravid, nontender  Extremities: Warm and well perfused.  Non tender.    Pregravid Weight/BMI: 59.9 kg (132 lb) (BMI 20.08)  Current Weight: 88.9 kg (196 lb)   Total Weight Gain: 29 kg (64 lb)     Pre-Valentín Vitals      Flowsheet Row Most Recent Value   Prenatal Assessment    Fetal Heart Rate 134   Fundal Height (cm) 38 cm   Movement Present   Presentation Vertex   Prenatal Vitals    Blood Pressure 100/64   Weight - Scale 88.9 kg (196 lb)   Urine Albumin/Glucose    Dilation/Effacement/Station    Vaginal Drainage    Edema              Miya Mosley DO  2025 7:57 AM     "

## 2025-06-02 ENCOUNTER — HOSPITAL ENCOUNTER (OUTPATIENT)
Dept: LABOR AND DELIVERY | Facility: HOSPITAL | Age: 35
Discharge: HOME | End: 2025-06-02
Payer: COMMERCIAL

## 2025-06-02 ENCOUNTER — ANESTHESIA EVENT (INPATIENT)
Dept: ANESTHESIOLOGY | Facility: HOSPITAL | Age: 35
End: 2025-06-02
Payer: COMMERCIAL

## 2025-06-02 ENCOUNTER — HOSPITAL ENCOUNTER (INPATIENT)
Facility: HOSPITAL | Age: 35
LOS: 1 days | Discharge: HOME | End: 2025-06-03
Attending: OBSTETRICS & GYNECOLOGY | Admitting: OBSTETRICS & GYNECOLOGY
Payer: COMMERCIAL

## 2025-06-02 ENCOUNTER — ANESTHESIA (INPATIENT)
Dept: ANESTHESIOLOGY | Facility: HOSPITAL | Age: 35
End: 2025-06-02
Payer: COMMERCIAL

## 2025-06-02 DIAGNOSIS — Z3A.38 38 WEEKS GESTATION OF PREGNANCY: ICD-10-CM

## 2025-06-02 PROBLEM — Z34.90 ENCOUNTER FOR ELECTIVE INDUCTION OF LABOR: Status: ACTIVE | Noted: 2025-06-02

## 2025-06-02 LAB
ABO GROUP BLD: NORMAL
BASE EXCESS BLDCOA CALC-SCNC: -3.3 MMOL/L (ref 3–11)
BASE EXCESS BLDCOV CALC-SCNC: -1.7 MMOL/L (ref 1–9)
BASOPHILS # BLD MANUAL: 0 THOUSAND/UL (ref 0–0.1)
BASOPHILS NFR MAR MANUAL: 0 % (ref 0–1)
BLD GP AB SCN SERPL QL: NEGATIVE
EOSINOPHIL # BLD MANUAL: 0.26 THOUSAND/UL (ref 0–0.4)
EOSINOPHIL NFR BLD MANUAL: 2 % (ref 0–6)
ERYTHROCYTE [DISTWIDTH] IN BLOOD BY AUTOMATED COUNT: 13.9 % (ref 11.6–15.1)
HCO3 BLDCOA-SCNC: 22.6 MMOL/L (ref 17.3–27.3)
HCO3 BLDCOV-SCNC: 22.2 MMOL/L (ref 12.2–28.6)
HCT VFR BLD AUTO: 36.6 % (ref 34.8–46.1)
HGB BLD-MCNC: 12 G/DL (ref 11.5–15.4)
HOLD SPECIMEN: NORMAL
LYMPHOCYTES # BLD AUTO: 1.41 THOUSAND/UL (ref 0.6–4.47)
LYMPHOCYTES # BLD AUTO: 11 % (ref 14–44)
MCH RBC QN AUTO: 29.9 PG (ref 26.8–34.3)
MCHC RBC AUTO-ENTMCNC: 32.8 G/DL (ref 31.4–37.4)
MCV RBC AUTO: 91 FL (ref 82–98)
METAMYELOCYTE ABSOLUTE CT: 0.13 THOUSAND/UL (ref 0–0.1)
METAMYELOCYTES NFR BLD MANUAL: 1 % (ref 0–1)
MONOCYTES # BLD AUTO: 1.28 THOUSAND/UL (ref 0–1.22)
MONOCYTES NFR BLD: 10 % (ref 4–12)
NEUTROPHILS # BLD MANUAL: 9.74 THOUSAND/UL (ref 1.85–7.62)
NEUTS BAND NFR BLD MANUAL: 3 % (ref 0–8)
NEUTS SEG NFR BLD AUTO: 73 % (ref 43–75)
O2 CT VFR BLDCOA CALC: 12.9 ML/DL
OXYHGB MFR BLDCOA: 53.8 %
OXYHGB MFR BLDCOV: 77.2 %
PCO2 BLDCOA: 43.3 MM[HG] (ref 30–60)
PCO2 BLDCOV: 35.8 MM HG (ref 27–43)
PH BLDCOA: 7.33 [PH] (ref 7.23–7.43)
PH BLDCOV: 7.41 [PH] (ref 7.19–7.49)
PLATELET # BLD AUTO: 244 THOUSANDS/UL (ref 149–390)
PLATELET BLD QL SMEAR: ADEQUATE
PMV BLD AUTO: 10.8 FL (ref 8.9–12.7)
PO2 BLDCOA: 23.5 MM HG (ref 5–25)
PO2 BLDCOV: 31.4 MM HG (ref 15–45)
RBC # BLD AUTO: 4.02 MILLION/UL (ref 3.81–5.12)
RBC MORPH BLD: NORMAL
RH BLD: POSITIVE
SAO2 % BLDCOV: 18.4 ML/DL
SPECIMEN EXPIRATION DATE: NORMAL
TREPONEMA PALLIDUM IGG+IGM AB [PRESENCE] IN SERUM OR PLASMA BY IMMUNOASSAY: NORMAL
WBC # BLD AUTO: 12.81 THOUSAND/UL (ref 4.31–10.16)

## 2025-06-02 PROCEDURE — 0HQ9XZZ REPAIR PERINEUM SKIN, EXTERNAL APPROACH: ICD-10-PCS | Performed by: OBSTETRICS & GYNECOLOGY

## 2025-06-02 PROCEDURE — 86901 BLOOD TYPING SEROLOGIC RH(D): CPT | Performed by: OBSTETRICS & GYNECOLOGY

## 2025-06-02 PROCEDURE — 85027 COMPLETE CBC AUTOMATED: CPT | Performed by: OBSTETRICS & GYNECOLOGY

## 2025-06-02 PROCEDURE — 10907ZC DRAINAGE OF AMNIOTIC FLUID, THERAPEUTIC FROM PRODUCTS OF CONCEPTION, VIA NATURAL OR ARTIFICIAL OPENING: ICD-10-PCS | Performed by: OBSTETRICS & GYNECOLOGY

## 2025-06-02 PROCEDURE — 82805 BLOOD GASES W/O2 SATURATION: CPT | Performed by: OBSTETRICS & GYNECOLOGY

## 2025-06-02 PROCEDURE — 86900 BLOOD TYPING SEROLOGIC ABO: CPT | Performed by: OBSTETRICS & GYNECOLOGY

## 2025-06-02 PROCEDURE — NC001 PR NO CHARGE: Performed by: OBSTETRICS & GYNECOLOGY

## 2025-06-02 PROCEDURE — 59409 OBSTETRICAL CARE: CPT | Performed by: OBSTETRICS & GYNECOLOGY

## 2025-06-02 PROCEDURE — 4A1HXCZ MONITORING OF PRODUCTS OF CONCEPTION, CARDIAC RATE, EXTERNAL APPROACH: ICD-10-PCS | Performed by: OBSTETRICS & GYNECOLOGY

## 2025-06-02 PROCEDURE — 85007 BL SMEAR W/DIFF WBC COUNT: CPT | Performed by: OBSTETRICS & GYNECOLOGY

## 2025-06-02 PROCEDURE — 86850 RBC ANTIBODY SCREEN: CPT | Performed by: OBSTETRICS & GYNECOLOGY

## 2025-06-02 PROCEDURE — 3E0R3BZ INTRODUCTION OF ANESTHETIC AGENT INTO SPINAL CANAL, PERCUTANEOUS APPROACH: ICD-10-PCS | Performed by: OBSTETRICS & GYNECOLOGY

## 2025-06-02 PROCEDURE — 86780 TREPONEMA PALLIDUM: CPT | Performed by: OBSTETRICS & GYNECOLOGY

## 2025-06-02 RX ORDER — CALCIUM CARBONATE 500 MG/1
1000 TABLET, CHEWABLE ORAL DAILY PRN
Status: DISCONTINUED | OUTPATIENT
Start: 2025-06-02 | End: 2025-06-03 | Stop reason: HOSPADM

## 2025-06-02 RX ORDER — SODIUM CHLORIDE, SODIUM LACTATE, POTASSIUM CHLORIDE, CALCIUM CHLORIDE 600; 310; 30; 20 MG/100ML; MG/100ML; MG/100ML; MG/100ML
125 INJECTION, SOLUTION INTRAVENOUS CONTINUOUS
Status: DISCONTINUED | OUTPATIENT
Start: 2025-06-02 | End: 2025-06-02

## 2025-06-02 RX ORDER — ONDANSETRON 2 MG/ML
4 INJECTION INTRAMUSCULAR; INTRAVENOUS EVERY 6 HOURS PRN
Status: DISCONTINUED | OUTPATIENT
Start: 2025-06-02 | End: 2025-06-02

## 2025-06-02 RX ORDER — OXYTOCIN/0.9 % SODIUM CHLORIDE 30/500 ML
250 PLASTIC BAG, INJECTION (ML) INTRAVENOUS ONCE
Status: DISCONTINUED | OUTPATIENT
Start: 2025-06-02 | End: 2025-06-03 | Stop reason: HOSPADM

## 2025-06-02 RX ORDER — BUPIVACAINE HYDROCHLORIDE 2.5 MG/ML
30 INJECTION, SOLUTION EPIDURAL; INFILTRATION; INTRACAUDAL; PERINEURAL ONCE AS NEEDED
Status: DISCONTINUED | OUTPATIENT
Start: 2025-06-02 | End: 2025-06-02

## 2025-06-02 RX ORDER — LIDOCAINE HYDROCHLORIDE AND EPINEPHRINE 15; 5 MG/ML; UG/ML
INJECTION, SOLUTION EPIDURAL
Status: COMPLETED | OUTPATIENT
Start: 2025-06-02 | End: 2025-06-02

## 2025-06-02 RX ORDER — BUTORPHANOL TARTRATE 1 MG/ML
1 INJECTION, SOLUTION INTRAMUSCULAR; INTRAVENOUS
Status: DISCONTINUED | OUTPATIENT
Start: 2025-06-02 | End: 2025-06-02

## 2025-06-02 RX ORDER — CEFAZOLIN SODIUM 2 G/50ML
2000 SOLUTION INTRAVENOUS ONCE
Status: COMPLETED | OUTPATIENT
Start: 2025-06-02 | End: 2025-06-02

## 2025-06-02 RX ORDER — ACETAMINOPHEN 325 MG/1
650 TABLET ORAL EVERY 4 HOURS PRN
Status: DISCONTINUED | OUTPATIENT
Start: 2025-06-02 | End: 2025-06-03 | Stop reason: HOSPADM

## 2025-06-02 RX ORDER — IBUPROFEN 600 MG/1
600 TABLET, FILM COATED ORAL EVERY 6 HOURS PRN
Status: DISCONTINUED | OUTPATIENT
Start: 2025-06-02 | End: 2025-06-03 | Stop reason: HOSPADM

## 2025-06-02 RX ORDER — DIPHENHYDRAMINE HYDROCHLORIDE 50 MG/ML
25 INJECTION, SOLUTION INTRAMUSCULAR; INTRAVENOUS EVERY 6 HOURS PRN
Status: DISCONTINUED | OUTPATIENT
Start: 2025-06-02 | End: 2025-06-03 | Stop reason: HOSPADM

## 2025-06-02 RX ORDER — ONDANSETRON 2 MG/ML
4 INJECTION INTRAMUSCULAR; INTRAVENOUS EVERY 8 HOURS PRN
Status: DISCONTINUED | OUTPATIENT
Start: 2025-06-02 | End: 2025-06-03 | Stop reason: HOSPADM

## 2025-06-02 RX ORDER — BENZOCAINE/MENTHOL 6 MG-10 MG
1 LOZENGE MUCOUS MEMBRANE DAILY PRN
Status: DISCONTINUED | OUTPATIENT
Start: 2025-06-02 | End: 2025-06-03 | Stop reason: HOSPADM

## 2025-06-02 RX ORDER — CEFAZOLIN SODIUM 1 G/50ML
1000 SOLUTION INTRAVENOUS EVERY 8 HOURS
Status: DISCONTINUED | OUTPATIENT
Start: 2025-06-02 | End: 2025-06-02

## 2025-06-02 RX ORDER — OXYTOCIN/0.9 % SODIUM CHLORIDE 30/500 ML
1-30 PLASTIC BAG, INJECTION (ML) INTRAVENOUS
Status: DISCONTINUED | OUTPATIENT
Start: 2025-06-02 | End: 2025-06-02

## 2025-06-02 RX ORDER — DOCUSATE SODIUM 100 MG/1
100 CAPSULE, LIQUID FILLED ORAL 2 TIMES DAILY
Status: DISCONTINUED | OUTPATIENT
Start: 2025-06-02 | End: 2025-06-03 | Stop reason: HOSPADM

## 2025-06-02 RX ADMIN — DOCUSATE SODIUM 100 MG: 100 CAPSULE, LIQUID FILLED ORAL at 17:49

## 2025-06-02 RX ADMIN — Medication 2 MILLI-UNITS/MIN: at 09:47

## 2025-06-02 RX ADMIN — CEFAZOLIN SODIUM 2000 MG: 2 SOLUTION INTRAVENOUS at 08:42

## 2025-06-02 RX ADMIN — SODIUM CHLORIDE, SODIUM LACTATE, POTASSIUM CHLORIDE, AND CALCIUM CHLORIDE 999 ML/HR: .6; .31; .03; .02 INJECTION, SOLUTION INTRAVENOUS at 08:27

## 2025-06-02 RX ADMIN — SODIUM CHLORIDE, SODIUM LACTATE, POTASSIUM CHLORIDE, AND CALCIUM CHLORIDE 125 ML/HR: .6; .31; .03; .02 INJECTION, SOLUTION INTRAVENOUS at 10:13

## 2025-06-02 RX ADMIN — WITCH HAZEL 1 PAD: 500 SOLUTION RECTAL; TOPICAL at 17:49

## 2025-06-02 RX ADMIN — IBUPROFEN 600 MG: 600 TABLET ORAL at 21:48

## 2025-06-02 RX ADMIN — LIDOCAINE HYDROCHLORIDE,EPINEPHRINE BITARTRATE 5 ML: 15; .005 INJECTION, SOLUTION EPIDURAL; INFILTRATION; INTRACAUDAL; PERINEURAL at 11:12

## 2025-06-02 RX ADMIN — BENZOCAINE AND LEVOMENTHOL 1 APPLICATION: 200; 5 SPRAY TOPICAL at 17:49

## 2025-06-02 RX ADMIN — IBUPROFEN 600 MG: 600 TABLET ORAL at 16:09

## 2025-06-02 RX ADMIN — ROPIVACAINE HYDROCHLORIDE: 2 INJECTION, SOLUTION EPIDURAL; INFILTRATION at 12:05

## 2025-06-02 NOTE — PLAN OF CARE
Problem: PAIN - ADULT  Goal: Verbalizes/displays adequate comfort level or baseline comfort level  Description: Interventions:  - Encourage patient to monitor pain and request assistance  - Assess pain using appropriate pain scale  - Administer analgesics as ordered based on type and severity of pain and evaluate response  - Implement non-pharmacological measures as appropriate and evaluate response  - Consider cultural and social influences on pain and pain management  - Notify physician/advanced practitioner if interventions unsuccessful or patient reports new pain  - Educate patient/family on pain management process including their role and importance of  reporting pain   - Provide non-pharmacologic/complimentary pain relief interventions  Outcome: Progressing     Problem: INFECTION - ADULT  Goal: Absence or prevention of progression during hospitalization  Description: INTERVENTIONS:  - Assess and monitor for signs and symptoms of infection  - Monitor lab/diagnostic results  - Monitor all insertion sites, i.e. indwelling lines, tubes, and drains  - Monitor endotracheal if appropriate and nasal secretions for changes in amount and color  - [place] appropriate cooling/warming therapies per order  - Administer medications as ordered  - Instruct and encourage patient and family to use good hand hygiene technique  - Identify and instruct in appropriate isolation precautions for identified infection/condition  Outcome: Progressing  Goal: Absence of fever/infection during neutropenic period  Description: INTERVENTIONS:  - Monitor WBC  - Perform strict hand hygiene  - Limit to healthy visitors only  - No plants, dried, fresh or silk flowers with escobar in patient room  Outcome: Progressing     Problem: SAFETY ADULT  Goal: Patient will remain free of falls  Description: INTERVENTIONS:  - Educate patient/family on patient safety including physical limitations  - Instruct patient to call for assistance with activity   -  Consider consulting OT/PT to assist with strengthening/mobility based on AM PAC & JH-HLM score  - Consult OT/PT to assist with strengthening/mobility   - Keep Call bell within reach  - Keep bed low and locked with side rails adjusted as appropriate  - Keep care items and personal belongings within reach  - Initiate and maintain comfort rounds  - Make Fall Risk Sign visible to staff  - Apply yellow socks and bracelet for high fall risk patients  - Consider moving patient to room near nurses station  Outcome: Progressing  Goal: Maintain or return to baseline ADL function  Description: INTERVENTIONS:  -  Assess patient's ability to carry out ADLs; assess patient's baseline for ADL function and identify physical deficits which impact ability to perform ADLs (bathing, care of mouth/teeth, toileting, grooming, dressing, etc.)  - Assess/evaluate cause of self-care deficits   - Assess range of motion  - Assess patient's mobility; develop plan if impaired  - Assess patient's need for assistive devices and provide as appropriate  - Encourage maximum independence but intervene and supervise when necessary  - Involve family in performance of ADLs  - Assess for home care needs following discharge   - Consider OT consult to assist with ADL evaluation and planning for discharge  - Provide patient education as appropriate  - Monitor functional capacity and physical performance, use of AM PAC & JH-HLM   - Monitor gait, balance and fatigue with ambulation    Outcome: Progressing  Goal: Maintains/Returns to pre admission functional level  Description: INTERVENTIONS:  - Perform AM-PAC 6 Click Basic Mobility/ Daily Activity assessment daily.  - Set and communicate daily mobility goal to care team and patient/family/caregiver.   - Collaborate with rehabilitation services on mobility goals if consulted    - Out of bed for toileting  - Record patient progress and toleration of activity level   Outcome: Progressing     Problem: DISCHARGE  PLANNING  Goal: Discharge to home or other facility with appropriate resources  Description: INTERVENTIONS:  - Identify barriers to discharge w/patient and caregiver  - Arrange for needed discharge resources and transportation as appropriate  - Identify discharge learning needs (meds, wound care, etc.)  - Arrange for interpretive services to assist at discharge as needed  - Refer to Case Management Department for coordinating discharge planning if the patient needs post-hospital services based on physician/advanced practitioner order or complex needs related to functional status, cognitive ability, or social support system  Outcome: Progressing     Problem: Knowledge Deficit  Goal: Patient/family/caregiver demonstrates understanding of disease process, treatment plan, medications, and discharge instructions  Description: Complete learning assessment and assess knowledge base.  Interventions:  - Provide teaching at level of understanding  - Provide teaching via preferred learning methods  Outcome: Progressing  Goal: Verbalizes understanding of labor plan  Description: Assess patient/family/caregiver's baseline knowledge level and ability to understand information.  Provide education via patient/family/caregiver's preferred learning method at appropriate level of understanding.     1. Provide teaching at level of understanding.  2. Provide teaching via preferred learning method(s).  Outcome: Progressing     Problem: Labor & Delivery  Goal: Manages discomfort  Description: Assess and monitor for signs and symptoms of discomfort.  Assess patient's pain level regularly and per hospital policy.  Administer medications as ordered. Support use of nonpharmacological methods to help control pain such as distraction, imagery, relaxation, and application of heat and cold.  Collaborate with interdisciplinary team and patient to determine appropriate pain management plan.    1. Include patient in decisions related to comfort.  2.  Offer non-pharmacological pain management interventions.  3. Report ineffective pain management to physician.  Outcome: Progressing  Goal: Patient vital signs are stable  Description: 1. Assess vital signs - vaginal delivery.  Outcome: Progressing

## 2025-06-02 NOTE — PLAN OF CARE
Problem: PAIN - ADULT  Goal: Verbalizes/displays adequate comfort level or baseline comfort level  Description: Interventions:  - Encourage patient to monitor pain and request assistance  - Assess pain using appropriate pain scale  - Administer analgesics as ordered based on type and severity of pain and evaluate response  - Implement non-pharmacological measures as appropriate and evaluate response  - Consider cultural and social influences on pain and pain management  - Notify physician/advanced practitioner if interventions unsuccessful or patient reports new pain  - Educate patient/family on pain management process including their role and importance of  reporting pain   - Provide non-pharmacologic/complimentary pain relief interventions  Outcome: Progressing     Problem: INFECTION - ADULT  Goal: Absence or prevention of progression during hospitalization  Description: INTERVENTIONS:  - Assess and monitor for signs and symptoms of infection  - Monitor lab/diagnostic results  - Monitor all insertion sites, i.e. indwelling lines, tubes, and drains  - Monitor endotracheal if appropriate and nasal secretions for changes in amount and color  - [place] appropriate cooling/warming therapies per order  - Administer medications as ordered  - Instruct and encourage patient and family to use good hand hygiene technique  - Identify and instruct in appropriate isolation precautions for identified infection/condition  Outcome: Progressing  Goal: Absence of fever/infection during neutropenic period  Description: INTERVENTIONS:  - Monitor WBC  - Perform strict hand hygiene  - Limit to healthy visitors only  - No plants, dried, fresh or silk flowers with escobar in patient room  Outcome: Progressing     Problem: SAFETY ADULT  Goal: Patient will remain free of falls  Description: INTERVENTIONS:  - Educate patient/family on patient safety including physical limitations  - Instruct patient to call for assistance with activity   -  Consider consulting OT/PT to assist with strengthening/mobility based on AM PAC & JH-HLM score  - Consult OT/PT to assist with strengthening/mobility   - Keep Call bell within reach  - Keep bed low and locked with side rails adjusted as appropriate  - Keep care items and personal belongings within reach  - Initiate and maintain comfort rounds  - Make Fall Risk Sign visible to staff  - Apply yellow socks and bracelet for high fall risk patients  - Consider moving patient to room near nurses station  Outcome: Progressing  Goal: Maintain or return to baseline ADL function  Description: INTERVENTIONS:  -  Assess patient's ability to carry out ADLs; assess patient's baseline for ADL function and identify physical deficits which impact ability to perform ADLs (bathing, care of mouth/teeth, toileting, grooming, dressing, etc.)  - Assess/evaluate cause of self-care deficits   - Assess range of motion  - Assess patient's mobility; develop plan if impaired  - Assess patient's need for assistive devices and provide as appropriate  - Encourage maximum independence but intervene and supervise when necessary  - Involve family in performance of ADLs  - Assess for home care needs following discharge   - Consider OT consult to assist with ADL evaluation and planning for discharge  - Provide patient education as appropriate  - Monitor functional capacity and physical performance, use of AM PAC & JH-HLM   - Monitor gait, balance and fatigue with ambulation    Outcome: Progressing  Goal: Maintains/Returns to pre admission functional level  Description: INTERVENTIONS:  - Perform AM-PAC 6 Click Basic Mobility/ Daily Activity assessment daily.  - Set and communicate daily mobility goal to care team and patient/family/caregiver.   - Collaborate with rehabilitation services on mobility goals if consulted  - Out of bed for toileting  - Record patient progress and toleration of activity level   Outcome: Progressing     Problem: DISCHARGE  PLANNING  Goal: Discharge to home or other facility with appropriate resources  Description: INTERVENTIONS:  - Identify barriers to discharge w/patient and caregiver  - Arrange for needed discharge resources and transportation as appropriate  - Identify discharge learning needs (meds, wound care, etc.)  - Arrange for interpretive services to assist at discharge as needed  - Refer to Case Management Department for coordinating discharge planning if the patient needs post-hospital services based on physician/advanced practitioner order or complex needs related to functional status, cognitive ability, or social support system  Outcome: Progressing     Problem: POSTPARTUM  Goal: Experiences normal postpartum course  Description: INTERVENTIONS:  - Monitor maternal vital signs  - Assess uterine involution and lochia  Outcome: Progressing  Goal: Appropriate maternal -  bonding  Description: INTERVENTIONS:  - Identify family support  - Assess for appropriate maternal/infant bonding   -Encourage maternal/infant bonding opportunities  - Referral to  or  as needed  Outcome: Progressing  Goal: Establishment of infant feeding pattern  Description: INTERVENTIONS:  - Assess breast/bottle feeding  - Refer to lactation as needed  Outcome: Progressing  Goal: Incision(s), wounds(s) or drain site(s) healing without S/S of infection  Description: INTERVENTIONS  - Assess and document dressing, incision, wound bed, drain sites and surrounding tissue  - Provide patient and family education  Outcome: Progressing

## 2025-06-02 NOTE — ANESTHESIA PREPROCEDURE EVALUATION
Procedure:  LABOR ANALGESIA    Relevant Problems   GYN   (+) Multigravida of advanced maternal age in third trimester      Behavioral Health   (+) History of drug use      Hx migraines  Hx GED  Anxiety/depression    Physical Exam    Airway     Mallampati score: II  TM Distance: >3 FB  Neck ROM: full      Cardiovascular      Dental   No notable dental hx     Pulmonary      Neurological      Other Findings  post-pubertal.      Anesthesia Plan  ASA Score- 2     Anesthesia Type- epidural with ASA Monitors.         Additional Monitors:     Airway Plan:            Plan Factors-    Chart reviewed.   Existing labs reviewed. Patient summary reviewed.    Patient is not a current smoker.              Induction-     Postoperative Plan-     Perioperative Resuscitation Plan - Level 1 - Full Code.       Informed Consent- Anesthetic plan and risks discussed with patient.        NPO Status:  No vitals data found for the desired time range.

## 2025-06-02 NOTE — ANESTHESIA POSTPROCEDURE EVALUATION
Post-Op Assessment Note    CV Status:  Stable  Pain Score: 0    Pain management: adequate      Post-op block assessment: secured with tape, site cleaned, catheter intact and no complications   Mental Status:  Alert and awake   Hydration Status:  Euvolemic   PONV Controlled:  Controlled   Airway Patency:  Patent     Post Op Vitals Reviewed: Yes    No anethesia notable event occurred.    Staff: CRNA         Last Filed PACU Vitals:  Vitals Value Taken Time   Temp     Pulse     BP     Resp     SpO2

## 2025-06-02 NOTE — L&D DELIVERY NOTE
Vaginal Delivery Summary - OB/GYN   Thea Fay 34 y.o. female MRN: [id number]  Unit/Bed#: -01 Encounter: [id number]    Predelivery Diagnosis:  1. Pregnancy at 39w0d  2. Term pregnancy  Single fetus     Postdelivery Diagnosis:  1. Same as above  2. Delivery of term   3. Same as above - Delivered    Procedure: Spontaneous Vaginal Delivery, repair of 1st degree perineal laceration    Attending: Dr. Elsie Aldana    Anesthesia:  Epidural    QBL: 50 cc  Admission Hg:   Recent Labs     25  0803   HGB 12.0     Admission platelets:   Recent Labs     25  0803            Complications: none apparent    Specimens: cord blood, arterial and venous cord blood gasses, placenta to storage    Findings:   1. Viable female at 1352, with APGARS of 8 and 9 at 1 and 5 minutes respectively,  2. Spontaneous delivery of intact placenta at 1359  3. 1st degree perineal laceration repaired with 2-0 Vicryl  4. Blood gases:    Umbilical Cord Venous Blood Gas:  Results from last 7 days   Lab Units 25  1352   PH COV  7.411   PCO2 COV mm HG 35.8   HCO3 COV mmol/L 22.2   BASE EXC COV mmol/L -1.7*   O2 CT CD VB mL/dL 18.4   O2 HGB, VENOUS CORD % 77.2     Umbilical Cord Arterial Blood Gas:  Results from last 7 days   Lab Units 25  1352   PH COA  7.335   PCO2 COA  43.3   PO2 COA mm HG 23.5   HCO3 COA mmol/L 22.6   BASE EXC COA mmol/L -3.3*   O2 CONTENT CORD ART ml/dl 12.9   O2 HGB, ARTERIAL CORD % 53.8       Disposition:  Patient tolerated the procedure well and was recovering in labor and delivery room     Brief History and Labor Course:    Thea Fay is a 34 y.o.  with an MARISELA of 2025, by Last Menstrual Period at 39w0d gestation who was admitted for Induction of labor.    Please see labor timeline for complete labor course.     The patient was completely dilated at 1256. She began to push at 1301.     Description of procedure    After pushing for 51 minutes, at 1352 patient delivered  a viable female , wt pending, apgars of 8 (1 min) and 9 (5 min). The fetal vertex delivered spontaneously. The baby was palpated for a nuchal cord and none was palpated..  The anterior shoulder delivered atraumatically with maternal expulsive forces and the assistance of downward traction. The posterior shoulder delivered with maternal expulsive forces and the assistance of upward traction. The remainder of the fetus delivered spontaneously.     Upon delivery, the infant was placed on the maternal abdomen and delayed cord clamping was performed.  The umbilical cord was then doubly clamped and cut.  The infant was noted to cry spontaneously and was moving all extremities appropriately. Arterial and venous cord blood gases and cord blood was collected for analysis. These were promptly sent to the lab. In the immediate post-partum, 30 units of IV pitocin was administered, and the uterus was noted to contract down well with massage and pitocin. The placenta delivered spontaneously at 1359 and was noted to have a centrally inserted 3 vessel cord.     The vagina, cervix, perineum, and rectum were inspected and there was noted to be a 1st degree perineal laceration.  The laceration was repaired with 2.0 vicryl on a CT needle,  Good hemostasis was noted.     At the conclusion of the procedure, all needle, sponge, and instrument counts were noted to be correct. Patient tolerated the procedure well and was allowed to recover in labor and delivery room with family and  before being transferred to the post-partum floor.     Elsie Aldana DO

## 2025-06-02 NOTE — OB LABOR/OXYTOCIN SAFETY PROGRESS
Oxytocin Safety Progress Check Note - Thea Fay 34 y.o. female MRN: [id number]    Unit/Bed#: -01 Encounter: [id number]    Dose (damian-units/min) Oxytocin: 4 damian-units/min  Contraction Frequency (minutes): 2-4  Contraction Intensity: Mild/Moderate, Moderate  Uterine Activity Characteristics: Regular  Cervical Dilation: 3        Cervical Effacement: 80  Fetal Station: -2  Baseline Rate (FHR): 125 bpm     FHR Category: I     Vital Signs:   Vitals:    06/02/25 1148   BP: 101/63   Pulse: (!) 110   Resp:    Temp:    SpO2: 98%       Notes/comments:   Pt comfortable with epidural.  Segovia catheter inserted in bladder.  Continue pitocin titration         Elsie Aldana DO 6/2/2025 11:57 AM

## 2025-06-02 NOTE — ANESTHESIA PROCEDURE NOTES
Epidural Block    Patient location during procedure: OB/L&D  Start time: 6/2/2025 11:12 AM  Reason for block: procedure for pain, at surgeon's request and primary anesthetic  Staffing  Performed by: Nabila Jimenez MD  Authorized by: Nabila Jimenez MD    Preanesthetic Checklist  Completed: patient identified, IV checked, site marked, risks and benefits discussed, surgical consent, monitors and equipment checked, pre-op evaluation and timeout performed  Epidural  Patient position: sitting  Prep: Betadine  Sedation Level: no sedation  Patient monitoring: cardiac monitor, continuous pulse oximetry, frequent blood pressure checks and heart rate  Approach: midline  Location: lumbar, L3-4  Injection technique: NATALIE air  Needle  Needle type: Tuohy   Needle gauge: 17 G  Needle insertion depth: 5.5 cm  Catheter type: multi-orifice  Catheter size: 19 G  Catheter at skin depth: 11 cm  Catheter securement method: clear occlusive dressing, stabilization device and tape  Test dose: negativelidocaine-epinephrine (XYLOCAINE-MPF/EPINEPHRINE) 1.5 %-1:200,000 injection 3 mL - Epidural   5 mL - 6/2/2025 11:12:00 AM  Assessment  Sensory level: T10  Number of attempts: 2negative aspiration for CSF, negative aspiration for heme and no paresthesia on injection  patient tolerated the procedure well with no immediate complications  Additional Notes  First attempt positive heme upon aspiration of catheter. Epidural removed  Second attempt at a higher lumbar level, successful, negative for aspiration of heme and CSF

## 2025-06-03 VITALS
TEMPERATURE: 97.7 F | WEIGHT: 196 LBS | DIASTOLIC BLOOD PRESSURE: 61 MMHG | SYSTOLIC BLOOD PRESSURE: 119 MMHG | HEART RATE: 91 BPM | BODY MASS INDEX: 29.7 KG/M2 | OXYGEN SATURATION: 98 % | HEIGHT: 68 IN | RESPIRATION RATE: 18 BRPM

## 2025-06-03 LAB
ERYTHROCYTE [DISTWIDTH] IN BLOOD BY AUTOMATED COUNT: 14.1 % (ref 11.6–15.1)
HCT VFR BLD AUTO: 33.7 % (ref 34.8–46.1)
HGB BLD-MCNC: 11.2 G/DL (ref 11.5–15.4)
MCH RBC QN AUTO: 30.7 PG (ref 26.8–34.3)
MCHC RBC AUTO-ENTMCNC: 33.2 G/DL (ref 31.4–37.4)
MCV RBC AUTO: 92 FL (ref 82–98)
NRBC BLD AUTO-RTO: 0 /100 WBCS
PLATELET # BLD AUTO: 196 THOUSANDS/UL (ref 149–390)
PMV BLD AUTO: 10.5 FL (ref 8.9–12.7)
RBC # BLD AUTO: 3.65 MILLION/UL (ref 3.81–5.12)
WBC # BLD AUTO: 14.88 THOUSAND/UL (ref 4.31–10.16)

## 2025-06-03 PROCEDURE — 99024 POSTOP FOLLOW-UP VISIT: CPT | Performed by: OBSTETRICS & GYNECOLOGY

## 2025-06-03 PROCEDURE — 85027 COMPLETE CBC AUTOMATED: CPT | Performed by: OBSTETRICS & GYNECOLOGY

## 2025-06-03 RX ORDER — ACETAMINOPHEN 325 MG/1
650 TABLET ORAL EVERY 4 HOURS PRN
Start: 2025-06-03

## 2025-06-03 RX ORDER — IBUPROFEN 600 MG/1
600 TABLET, FILM COATED ORAL EVERY 6 HOURS PRN
Start: 2025-06-03

## 2025-06-03 RX ORDER — DOCUSATE SODIUM 100 MG/1
100 CAPSULE, LIQUID FILLED ORAL 2 TIMES DAILY
Start: 2025-06-03

## 2025-06-03 RX ORDER — OXYCODONE HYDROCHLORIDE 5 MG/1
5 TABLET ORAL EVERY 4 HOURS PRN
Refills: 0 | Status: DISCONTINUED | OUTPATIENT
Start: 2025-06-03 | End: 2025-06-03 | Stop reason: HOSPADM

## 2025-06-03 RX ORDER — KETOROLAC TROMETHAMINE 30 MG/ML
30 INJECTION, SOLUTION INTRAMUSCULAR; INTRAVENOUS ONCE
Status: COMPLETED | OUTPATIENT
Start: 2025-06-03 | End: 2025-06-03

## 2025-06-03 RX ADMIN — ACETAMINOPHEN 650 MG: 325 TABLET, FILM COATED ORAL at 15:57

## 2025-06-03 RX ADMIN — KETOROLAC TROMETHAMINE 30 MG: 30 INJECTION, SOLUTION INTRAMUSCULAR; INTRAVENOUS at 07:52

## 2025-06-03 RX ADMIN — ACETAMINOPHEN 650 MG: 325 TABLET, FILM COATED ORAL at 03:36

## 2025-06-03 RX ADMIN — ACETAMINOPHEN 650 MG: 325 TABLET, FILM COATED ORAL at 10:14

## 2025-06-03 RX ADMIN — DOCUSATE SODIUM 100 MG: 100 CAPSULE, LIQUID FILLED ORAL at 08:00

## 2025-06-03 RX ADMIN — PRENATAL VITAMINS-IRON FUMARATE 27 MG IRON-FOLIC ACID 0.8 MG TABLET 1 TABLET: at 08:00

## 2025-06-03 RX ADMIN — OXYCODONE HYDROCHLORIDE 5 MG: 5 TABLET ORAL at 10:18

## 2025-06-03 RX ADMIN — IBUPROFEN 600 MG: 600 TABLET ORAL at 03:34

## 2025-06-03 RX ADMIN — OXYCODONE HYDROCHLORIDE 5 MG: 5 TABLET ORAL at 13:57

## 2025-06-03 RX ADMIN — IBUPROFEN 600 MG: 600 TABLET ORAL at 12:58

## 2025-06-03 NOTE — PLAN OF CARE
Problem: PAIN - ADULT  Goal: Verbalizes/displays adequate comfort level or baseline comfort level  Description: Interventions:  - Encourage patient to monitor pain and request assistance  - Assess pain using appropriate pain scale  - Administer analgesics as ordered based on type and severity of pain and evaluate response  - Implement non-pharmacological measures as appropriate and evaluate response  - Consider cultural and social influences on pain and pain management  - Notify physician/advanced practitioner if interventions unsuccessful or patient reports new pain  - Educate patient/family on pain management process including their role and importance of  reporting pain   - Provide non-pharmacologic/complimentary pain relief interventions  Outcome: Progressing     Problem: INFECTION - ADULT  Goal: Absence or prevention of progression during hospitalization  Description: INTERVENTIONS:  - Assess and monitor for signs and symptoms of infection  - Monitor lab/diagnostic results  - Monitor all insertion sites, i.e. indwelling lines, tubes, and drains  - Monitor endotracheal if appropriate and nasal secretions for changes in amount and color  - [place] appropriate cooling/warming therapies per order  - Administer medications as ordered  - Instruct and encourage patient and family to use good hand hygiene technique  - Identify and instruct in appropriate isolation precautions for identified infection/condition  Outcome: Progressing  Goal: Absence of fever/infection during neutropenic period  Description: INTERVENTIONS:  - Monitor WBC  - Perform strict hand hygiene  - Limit to healthy visitors only  - No plants, dried, fresh or silk flowers with escobar in patient room  Outcome: Progressing     Problem: SAFETY ADULT  Goal: Patient will remain free of falls  Description: INTERVENTIONS:  - Educate patient/family on patient safety including physical limitations  - Instruct patient to call for assistance with activity   -  Consider consulting OT/PT to assist with strengthening/mobility based on AM PAC & JH-HLM score  - Consult OT/PT to assist with strengthening/mobility   - Keep Call bell within reach  - Keep bed low and locked with side rails adjusted as appropriate  - Keep care items and personal belongings within reach  - Initiate and maintain comfort rounds  - Make Fall Risk Sign visible to staff  - Apply yellow socks and bracelet for high fall risk patients  - Consider moving patient to room near nurses station  Outcome: Progressing  Goal: Maintain or return to baseline ADL function  Description: INTERVENTIONS:  -  Assess patient's ability to carry out ADLs; assess patient's baseline for ADL function and identify physical deficits which impact ability to perform ADLs (bathing, care of mouth/teeth, toileting, grooming, dressing, etc.)  - Assess/evaluate cause of self-care deficits   - Assess range of motion  - Assess patient's mobility; develop plan if impaired  - Assess patient's need for assistive devices and provide as appropriate  - Encourage maximum independence but intervene and supervise when necessary  - Involve family in performance of ADLs  - Assess for home care needs following discharge   - Consider OT consult to assist with ADL evaluation and planning for discharge  - Provide patient education as appropriate  - Monitor functional capacity and physical performance, use of AM PAC & JH-HLM   - Monitor gait, balance and fatigue with ambulation    Outcome: Progressing  Goal: Maintains/Returns to pre admission functional level  Description: INTERVENTIONS:  - Perform AM-PAC 6 Click Basic Mobility/ Daily Activity assessment daily.  - Set and communicate daily mobility goal to care team and patient/family/caregiver.   - Collaborate with rehabilitation services on mobility goals if consulted  - Out of bed for toileting  - Record patient progress and toleration of activity level   Outcome: Progressing     Problem: DISCHARGE  PLANNING  Goal: Discharge to home or other facility with appropriate resources  Description: INTERVENTIONS:  - Identify barriers to discharge w/patient and caregiver  - Arrange for needed discharge resources and transportation as appropriate  - Identify discharge learning needs (meds, wound care, etc.)  - Arrange for interpretive services to assist at discharge as needed  - Refer to Case Management Department for coordinating discharge planning if the patient needs post-hospital services based on physician/advanced practitioner order or complex needs related to functional status, cognitive ability, or social support system  Outcome: Progressing     Problem: POSTPARTUM  Goal: Experiences normal postpartum course  Description: INTERVENTIONS:  - Monitor maternal vital signs  - Assess uterine involution and lochia  Outcome: Progressing  Goal: Appropriate maternal -  bonding  Description: INTERVENTIONS:  - Identify family support  - Assess for appropriate maternal/infant bonding   -Encourage maternal/infant bonding opportunities  - Referral to  or  as needed  Outcome: Progressing  Goal: Establishment of infant feeding pattern  Description: INTERVENTIONS:  - Assess breast/bottle feeding  - Refer to lactation as needed  Outcome: Progressing  Goal: Incision(s), wounds(s) or drain site(s) healing without S/S of infection  Description: INTERVENTIONS  - Assess and document dressing, incision, wound bed, drain sites and surrounding tissue  - Provide patient and family education  Outcome: Progressing

## 2025-06-03 NOTE — DISCHARGE SUMMARY
ADMISSION  Patient of: [facility] [place]  Admission Date: 2025   Admitting Attending: Dr. Elsie Aldana DO   Admitting Diagnoses: Problem List[1]    DELIVERY  Delivery Method: Vaginal, Spontaneous   Delivery Date and Time: 2025 1:52 PM  Delivery Attending: Elsie Aldana     DISCHARGE  Discharge Date: 6/3/25  Discharge Attending: Dr. Elsie Aldana DO   Discharge Diagnosis:   Same, Delivered     Clinical course: Admission to Delivery  Thea Fay is a 34 y.o.  who was admitted at 39w0d for IOL.     Reason for induction:  .      Pt was in spontaneous labor and managed expectantly.       For pain control in labor, pt received  epidural.       Delivery  Route of Delivery: Vaginal, Spontaneous        Anesthesia: Epidural,   QBL: Non-Surgical QBL (mL): 50      QBL:        Delivery: Vaginal, Spontaneous at 2025 1:52 PM   Laceration: Perineal: 1° Repaired? Yes    Baby's Weight: 3320 g (7 lb 5.1 oz); 117.11    Apgar scores: 8  and 9  at 1 and 5 minutes, respectively    Clinical Course: Post-Delivery:  The post delivery course was unremarkable.    On the day of discharge, the patient was ambulating, voiding spontaneously, tolerating oral intake, and hemodynamically stable. She was able to reasonably perform all ADLs. She had appropriate bowel function. Pain was well-controlled. She was discharged home on postpartum/postop day #1 without complications . Patient was instructed to follow up with her OB as an outpatient and was given appropriate warnings to call her provider with problems or concerns.    Pertinent lab findings included:   Blood type O+.     Last three Hgb values:  Lab Results   Component Value Date    HGB 11.2 (L) 2025    HGB 12.0 2025    HGB 11.5 2025        Problem-specific follow-up plans included the following:  NA    Discharge med list:  Contraception:       Medication List      START taking these medications     acetaminophen 325 mg tablet; Commonly  known as: TYLENOL; Take 2 tablets   (650 mg total) by mouth every 4 (four) hours as needed for mild pain   docusate sodium 100 mg capsule; Commonly known as: COLACE; Take 1   capsule (100 mg total) by mouth 2 (two) times a day   ibuprofen 600 mg tablet; Commonly known as: MOTRIN; Take 1 tablet (600   mg total) by mouth every 6 (six) hours as needed for moderate pain     CONTINUE taking these medications     albuterol 90 mcg/act inhaler; Commonly known as: PROVENTIL HFA,VENTOLIN   HFA   PRENATAL 1 PO       Condition at discharge:   good     Disposition:    Home    Planned Readmission:   No         [1]   Patient Active Problem List  Diagnosis    Multigravida of advanced maternal age in third trimester    Group B streptococcal bacteriuria complicating pregnancy    38 weeks gestation of pregnancy    History of drug use    URI (upper respiratory infection)    Encounter for elective induction of labor     (spontaneous vaginal delivery)

## 2025-06-03 NOTE — PLAN OF CARE
Problem: PAIN - ADULT  Goal: Verbalizes/displays adequate comfort level or baseline comfort level  Description: Interventions:  - Encourage patient to monitor pain and request assistance  - Assess pain using appropriate pain scale  - Administer analgesics as ordered based on type and severity of pain and evaluate response  - Implement non-pharmacological measures as appropriate and evaluate response  - Consider cultural and social influences on pain and pain management  - Notify physician/advanced practitioner if interventions unsuccessful or patient reports new pain  - Educate patient/family on pain management process including their role and importance of  reporting pain   - Provide non-pharmacologic/complimentary pain relief interventions  Outcome: Adequate for Discharge     Problem: INFECTION - ADULT  Goal: Absence or prevention of progression during hospitalization  Description: INTERVENTIONS:  - Assess and monitor for signs and symptoms of infection  - Monitor lab/diagnostic results  - Monitor all insertion sites, i.e. indwelling lines, tubes, and drains  - Monitor endotracheal if appropriate and nasal secretions for changes in amount and color  - [place] appropriate cooling/warming therapies per order  - Administer medications as ordered  - Instruct and encourage patient and family to use good hand hygiene technique  - Identify and instruct in appropriate isolation precautions for identified infection/condition  Outcome: Adequate for Discharge  Goal: Absence of fever/infection during neutropenic period  Description: INTERVENTIONS:  - Monitor WBC  - Perform strict hand hygiene  - Limit to healthy visitors only  - No plants, dried, fresh or silk flowers with escobar in patient room  Outcome: Adequate for Discharge     Problem: SAFETY ADULT  Goal: Patient will remain free of falls  Description: INTERVENTIONS:  - Educate patient/family on patient safety including physical limitations  - Instruct patient to call  for assistance with activity   - Consider consulting OT/PT to assist with strengthening/mobility based on AM PAC & JH-HLM score  - Consult OT/PT to assist with strengthening/mobility   - Keep Call bell within reach  - Keep bed low and locked with side rails adjusted as appropriate  - Keep care items and personal belongings within reach  - Initiate and maintain comfort rounds    Outcome: Adequate for Discharge  Goal: Maintain or return to baseline ADL function  Description: INTERVENTIONS:  -  Assess patient's ability to carry out ADLs; assess patient's baseline for ADL function and identify physical deficits which impact ability to perform ADLs (bathing, care of mouth/teeth, toileting, grooming, dressing, etc.)  - Assess/evaluate cause of self-care deficits   - Assess range of motion  - Assess patient's mobility; develop plan if impaired  - Assess patient's need for assistive devices and provide as appropriate  - Encourage maximum independence but intervene and supervise when necessary  - Involve family in performance of ADLs  - Assess for home care needs following discharge   - Consider OT consult to assist with ADL evaluation and planning for discharge  - Provide patient education as appropriate  - Monitor functional capacity and physical performance, use of AM PAC & JH-HLM   - Monitor gait, balance and fatigue with ambulation    Outcome: Adequate for Discharge  Goal: Maintains/Returns to pre admission functional level  Description: INTERVENTIONS:  - Perform AM-PAC 6 Click Basic Mobility/ Daily Activity assessment daily.  - Set and communicate daily mobility goal to care team and patient/family/caregiver.     Outcome: Adequate for Discharge     Problem: DISCHARGE PLANNING  Goal: Discharge to home or other facility with appropriate resources  Description: INTERVENTIONS:  - Identify barriers to discharge w/patient and caregiver  - Arrange for needed discharge resources and transportation as appropriate  - Identify  discharge learning needs (meds, wound care, etc.)  - Arrange for interpretive services to assist at discharge as needed  - Refer to Case Management Department for coordinating discharge planning if the patient needs post-hospital services based on physician/advanced practitioner order or complex needs related to functional status, cognitive ability, or social support system  Outcome: Adequate for Discharge     Problem: POSTPARTUM  Goal: Experiences normal postpartum course  Description: INTERVENTIONS:  - Monitor maternal vital signs  - Assess uterine involution and lochia  Outcome: Adequate for Discharge  Goal: Appropriate maternal -  bonding  Description: INTERVENTIONS:  - Identify family support  - Assess for appropriate maternal/infant bonding   -Encourage maternal/infant bonding opportunities  - Referral to  or  as needed  Outcome: Adequate for Discharge  Goal: Establishment of infant feeding pattern  Description: INTERVENTIONS:  - Assess breast/bottle feeding  - Refer to lactation as needed  Outcome: Adequate for Discharge  Goal: Incision(s), wounds(s) or drain site(s) healing without S/S of infection  Description: INTERVENTIONS  - Assess and document dressing, incision, wound bed, drain sites and surrounding tissue  - Provide patient and family education    Outcome: Adequate for Discharge

## 2025-06-03 NOTE — ASSESSMENT & PLAN NOTE
- meeting postpartum milestones  - just received toradol for pain control  - discussed adding roxicodone if further pain management needs beyond tylneol and motrin

## 2025-06-03 NOTE — PROGRESS NOTES
Progress Note - OB/GYN   Name: Thea Fay 34 y.o. female I MRN: [id number]  Unit/Bed#: -01 I Date of Admission: 2025   Date of Service: 6/3/2025 I Hospital Day: 1    Assessment & Plan   (spontaneous vaginal delivery)  - meeting postpartum milestones  - just received toradol for pain control  - discussed adding roxicodone if further pain management needs beyond tylneol and motrin    OB Post-Partum Progress Note  Subjective   Post delivery. Patient is doing well. Lochia WNL. Cramping pain has not been well controlled - just received toradol IV.    Pain: yes, cramping, improved with meds  Tolerating PO: yes  Voiding: yes  Flatus: yes  BM: no  Ambulating: yes  Breastfeeding:  yes  Chest pain: no  Shortness of breath: no  Leg pain: no  Lochia: WNL    Objective :  Temp:  [97.8 °F (36.6 °C)-98.7 °F (37.1 °C)] 97.9 °F (36.6 °C)  HR:  [] 92  BP: ()/(41-86) 116/69  Resp:  [17-18] 17  SpO2:  [94 %-100 %] 97 %  O2 Device: None (Room air)    Physical Exam  Constitutional:       Appearance: Normal appearance.   HENT:      Head: Normocephalic.     Cardiovascular:      Rate and Rhythm: Normal rate and regular rhythm.   Pulmonary:      Effort: Pulmonary effort is normal.   Abdominal:      Palpations: Abdomen is soft.      Tenderness: There is no abdominal tenderness.      Comments: Fundus firm below umbilicus     Musculoskeletal:         General: No swelling.     Neurological:      General: No focal deficit present.      Mental Status: She is alert and oriented to person, place, and time.     Skin:     General: Skin is warm and dry.     Psychiatric:         Mood and Affect: Mood normal.         Behavior: Behavior normal.   Vitals reviewed.            Lab Results: I have reviewed the following results:  Lab Results   Component Value Date    WBC 14.88 (H) 2025    HGB 11.2 (L) 2025    HCT 33.7 (L) 2025    MCV 92 2025     2025

## 2025-06-03 NOTE — LACTATION NOTE
This note was copied from a baby's chart.  CONSULT - LACTATION  Baby Girl (Gustavo Fay 1 days female MRN: [id number]    [facility] NURSERY Room / Bed: (N)/(N) Encounter: [id number]    Maternal Information     MOTHER:  Maegan Fay  Maternal Age: 34 y.o.  OB History: # 1 - Date: 13, Sex: Male, Weight: 3175 g (7 lb), GA: 39w0d, Type: Vaginal, Spontaneous, Apgar1: None, Apgar5: None, Living: Living, Birth Comments: None    # 2 - Date: 18, Sex: Female, Weight: 2954 g (6 lb 8.2 oz), GA: 39w2d, Type: Vaginal, Spontaneous, Apgar1: 8, Apgar5: 9, Living: Living, Birth Comments: None    # 3 - Date: 2020, Sex: None, Weight: None, GA: None, Type: Therapeutic , Apgar1: None, Apgar5: None, Living: None, Birth Comments: D&E    # 4 - Date: 25, Sex: Female, Weight: 3320 g (7 lb 5.1 oz), GA: 39w0d, Type: Vaginal, Spontaneous, Apgar1: 8, Apgar5: 9, Living: Living, Birth Comments: None   Previous breast reduction surgery? No    Lactation history:   Has patient previously breast fed: Yes   How long had patient previously breast fed: about 3 months with each   Previous breast feeding complications: Lack of support, Other (Comment) (Return to work)     Past Surgical History:   Procedure Laterality Date    HAND SURGERY Right     LUMBAR PUNCTURE      WISDOM TOOTH EXTRACTION         Birth information:  YOB: 2026   Time of birth: 1:52 PM   Sex: female   Delivery type: Vaginal, Spontaneous   Birth Weight: 3320 g (7 lb 5.1 oz)   Percent of Weight Change: -3%     Gestational Age: 39w0d   [unfilled]    Reason for Consult:    Reason for Consult: Initial assessment (routine) - 10 min, Latch Assess (routine) - 10 min, Discharge (routine) - 10 min    Risk Factors:         Breast and nipple assessment:   Breasts/Nipples  Left Breast: Soft  Right Breast: Soft  Left Nipple: Everted, Tender  Right Nipple: Everted, Sore  Intervention: Other (comment),  Lanolin (Review deep latch with active suckling)  Breastfeeding Status: Yes  Breastfeeding Progress: Not yet established    OB Lactation Tools:    Other OB Lactation Tools  Feeding Devices: Lanolin    Breast Pump:    Breast Pump  Pump: Personal (has Eusebio from Insurance)  Pump Review/Education: Milk storage       Assessment: normal assessment    Feeding assessment: latch difficulty (Dyad with shallow latch; working on consistent deep latch with active suckling)    LATCH:  Latch: Repeated attempts, hold nipple in mouth, stimulate to suck   Audible Swallowing: A few with stimulation   Type of Nipple: Everted (After stimulation)   Comfort (Breast/Nipple): Filling, red/small blisters/bruises, mild/moderate discomfort   Hold (Positioning): Partial assist, teach one side, mother does other, staff holds   LATCH Score: 6       EduardoelLuis En[place]eker:                   Feeding recommendations:  breast feed on demand       25 1440   Patient Follow-Up   Lactation Consult Status 2   Follow-Up Type Inpatient;Call as needed   Other OB Lactation Documentation    Additional Problem Noted Initial Visit - Mom with experience; sore nipples; worked on deep latch with active suckling  (Ready, Set Baby; Painful Nipples; Checklist Essesentals for Positioning  & Latch; Congratulations on Your Baby! AND Discharge Booklets @ bedside)       Mom C/O sore nipples.  Information given about sore nipples and how to correct with positioning techniques. Discussed maneuvers to latch infant on properly to avoid nipple pain and promote healing.  Discussed treatments that could be utilized to promote healing. Hydro gel dressings given with instruction and verbalization of understanding of cleaning and duration of use.      Review Education on positioning and alignment. Mom is encouraged to:     - Bring baby up to the breast (use of pillows to elevate so baby's torso is against mom's breasts)   - Skin to skin for feedings with top hand exposed to show  signs of satiation   - Chin deep into breast tissue (make baby look up to the nipple)   - nose aligned to the nipple   -Wait for wide gape, place chin behind the areola on the breast so nipple is at the nose. Once baby opens their mouth wide, the nipple will be aimed at the upper, back palate  - Cheeks should be touching breast, and baby should have a long neck   - Ear, shoulder, hip will be in alignment   - Deep, snug hold of baby up to the breast   - Every baby knows how to breathe at the breast. The tip of the nose may appear to touch the breast. Babies breathe from the side of the nasal passages. If baby can not breathe due to improper alignment, baby will unlatch    Mom had baby on left breast using cross cradle with shallow latch. With permission; Worked on positioning infant up at chest level and starting to feed infant with nose arriving at the nipple. Then, using areolar compression to achieve a deep latch that is comfortable and exchanges optimum amounts of milk.  Guided dyad to deeper latch with active suckling. Nursing parent  was able to note signs of a good feeding like: less discomfort after latch on tenderness, good rocker suckling with stimulation, breast softening; rounded nipple and relaxed tone after nursing at breast.     Also Ready, Set Baby & discharge breastfeeding booklets @ bedside including the feeding log. Emphasized 8 or more (12) feedings in a 24 hour period, what to expect for the number of diapers per day of life and the progression of properties of the  stooling pattern.    List of reasons to call a lactation consultant.  Feeding logs  Feeding cues  Hand expression  Baby's Second day (cluster feeding)  Breastfeeding and Your Lifestyle (Medications, Alcohol, Caffeine, Smoking, Street Drugs, Methadone)  First Two Weeks Survival Guide for Breastfeeding  Breast Changes  Physical Therapy  Storage and Handling of Breast milk  How to Keep Your Breast Pump Kit Clean  The Employed  Breastfeeding Mother  Mixed feeding  Bottle feeding like breastfeeding (paced bottle feeding)  astfeeding and your lifestyle, storage and preparation of breast milk, how to keep you breast pump clean, the employed breastfeeding mother and paced bottle feeding handouts.     Booklet included Breastfeeding Resources for after discharge including access to the number for the [facility].    Encoraged nursing parent to call for assistance, questions and concerns.  Extension number for inpatient lactation support provided.       Nikole Cam RN 6/3/2025 3:51 PM

## 2025-06-05 ENCOUNTER — TELEPHONE (OUTPATIENT)
Dept: OBGYN CLINIC | Facility: CLINIC | Age: 35
End: 2025-06-05

## 2025-06-05 NOTE — TELEPHONE ENCOUNTER
POSTPARTUM PHONE CALL ASSESSMENT    Date of Delivery: 25    Delivering Provider: Dr Aldana    Mode:     Delivery Notes/Complications: No complications    How are you doing physically/emotionally? Patient reports she is doing well overall, but notes having problems w/BF, has appointment to see [facility]     Breastfeeding/Formula/Both? Breast and bottle feeding    Do you still have bleeding? yes  If so, how much/how severe? light vaginal bleeding    Do you have any pain? yes  If so, how much/how severe? mild pain and moderate pain - cramping and sciatic discomfort - encouraged Tylenol/Motrin as needed, verbalized understanding    Regular BMs/Urination? yes    Do you have any other questions or concerns for us or your provider? no     Have you scheduled the pediatrician appointment with pediatrician? yes    Do you have a postpartum visit scheduled? yes  Date scheduled: 25 Provider:  Dr Aldana

## 2025-06-10 LAB — PLACENTA IN STORAGE: NORMAL

## 2025-06-24 ENCOUNTER — OFFICE VISIT (OUTPATIENT)
Age: 35
End: 2025-06-24
Payer: COMMERCIAL

## 2025-06-24 PROCEDURE — 99404 PREV MED CNSL INDIV APPRX 60: CPT | Performed by: PEDIATRICS
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
## 2025-06-24 NOTE — PATIENT INSTRUCTIONS
"-Continue to offer Ricco the breast when feeding motivated to do so!   -Pump to replace missed feedings at the breast to increase production.   -Your nipples measured at 13 mm on the right and 14 mm on the left. I recommend trying these size flanges for pumping. Goal with \"correct\" flange fit is that there is no pain with pumping, there is little to no areola tunneling into the flange, and milk is spraying from the nipple when pumping.  --Cycle pumping : high speed, low suction until you see your milk flow, then switch to a lower speed and higher suction to express the milk efficiently. Continue to gradually increase suction and decrease speed throughout the session. You may cycle back into \"massage mode\" to stimulate another letdown when you see your milk flow slow within the pumping session or at the end of the pumping session.   -Try to be as relaxed as possible while pumping, take some deep breaths, feeling yourself relax from head to toe, watch something funny on your phone or TV, cover flanges, etc.    -Wear a supportive, but not tight, bra. Sit comfortably reclined when nursing or pumping, and throughout the day when possible.    -Follow up with breastfeeding medicine for Ricco as scheduled.   "

## 2025-06-26 ENCOUNTER — OFFICE VISIT (OUTPATIENT)
Age: 35
End: 2025-06-26
Payer: COMMERCIAL

## 2025-06-26 VITALS — SYSTOLIC BLOOD PRESSURE: 104 MMHG | DIASTOLIC BLOOD PRESSURE: 62 MMHG

## 2025-06-26 PROCEDURE — 99205 OFFICE O/P NEW HI 60 MIN: CPT | Performed by: PEDIATRICS

## 2025-06-26 NOTE — PROGRESS NOTES
Name: Thea Fay      : 1990      MRN: [id number]  Encounter Provider: Laine Nuno MD  Encounter Date: 2025   Encounter department: [facility]      BREAST FEEDING FOLLOW UP VISIT  :  Assessment & Plan  Encounter for care or examination of lactating mother       Discussed history and physical exams with Thea. Support given for her commitment to providing breast milk for her baby. Discussed the findings on the baby's exam consistent with tongue tie and reviewed how this may be the cause of nipple trauma, nipple pain, nipple damage, poor milk transfer, blocked ducts, mastitis, and loss of milk production. Discussed the science that supports performing the frenotomy to improve latch.   Recommended continued feeding on demand, offering the breast as often as comfortable. Recommended expressing breast milk whenever the baby is fed by bottle and as needed for comfort. Additional milk expression may be done whenever Maegan feels she has the time, patience, and energy for the extra demand for production.    Discussed the balance between hormonal control of milk production and milk removal to build and maintain milk production. Discussed the potential that the baby's less than effective suck may have negatively impacted milk production, recommended a couple of supplements that may increase prolactin and improve response to nipple and areolar stimulation.    A follow up appointment was scheduled and additional support from [facility] is still available.       History of Present Illness       Informant/Relationship: Maegan/mom    Discussion of General Lactation Issues: Breastfeeding has been painful, starting in the hospital. Attachment is about 8-9/10. Once breastfeeding is established, pain decreases to 4-5/10. Maegan's nipples are pointed on top and slanted down when Ricco is finished breastfeeding. Ricco does spill routinely from the  bottle. Meeting with the lactation consultant has improved overall ability to feed at the breast, but pain remains. In addition, Maegan is doesn't think she is producing all the milk that Ricco needs. Ricco seems more satisfied when she is offered supplement with a tube at the breast.    Infant is 24 days old today.         Interval Breastfeeding History:    Frequency of breast feeding: at least 4 x/day  Does mother feel breastfeeding is effective: Yes  Does infant appear satisfied after nursing:If no, explain: always seems cranky when she is done feeding  Stooling pattern normal:Yes  Urinating frequently:Yes  Using shield or shells: no    Alternative/Artificial Feedings:   Bottle: Yes, Dr. Brown's, size 1, mom paces; dad does less  Cup: No  Syringe/Finger: Yes, SNS sometimes, tried to use this exclusively today           Formula Type: Enfamil NeuroPro                     Amount: 2-3 oz            Breast Milk:                      Amount: direct and whatever is available, added or given as a bottle if it looks like enough            Frequency Q 3 Hr between feedings  Elimination Problems: No      Equipment:  Nipple Shield             Type: n/a             Size: n/a             Frequency of Use: n/a  Pump            Type: MomCozy S12; 24 mm flange; maximum suction 2            Frequency of Use: at least twice daily, usually in the morning and right before bed  Shells            Type: n/a            Frequency of use: n/a    Equipment Problems:yes - flanges are too big      Mom:  Breast: Rounded, full, closely spaced  Nipple Assessment in General: Normal: elongated/eraser, no discoloration and no damage noted.  Mother's Awareness of Feeding Cues                 Recognizes: Yes                  Verbalizes: Yes  Support System: FOB, MGM, extended family  History of Breastfeeding:  older children until 3-4 months per her feeding plan  Changes/Stressors/Violence: Breastfeeding is painful, milk production is not  meeting infant demand  Concerns/Goals: Maegan wishes to provide breast milk for at least 3-4 months or as long as she is not working    Problems with Mom: breastfeeding pain, hypogalactia    Objective   /62   LMP 09/02/2024        Physical Exam  Constitutional:       Appearance: Normal appearance. She is well-developed and normal weight.   HENT:      Head: Normocephalic and atraumatic.     Eyes:      Extraocular Movements: Extraocular movements intact.     Neck:      Thyroid: No thyromegaly.     Cardiovascular:      Rate and Rhythm: Normal rate and regular rhythm.      Pulses: Normal pulses.      Heart sounds: Normal heart sounds. No murmur heard.  Pulmonary:      Effort: Pulmonary effort is normal.      Breath sounds: Normal breath sounds.     Musculoskeletal:         General: No swelling or tenderness. Normal range of motion.      Cervical back: Normal range of motion and neck supple.      Right lower leg: No edema.      Left lower leg: No edema.   Lymphadenopathy:      Cervical: No cervical adenopathy.      Upper Body:      Right upper body: No pectoral adenopathy.      Left upper body: No pectoral adenopathy.     Neurological:      General: No focal deficit present.      Mental Status: She is alert and oriented to person, place, and time.     Psychiatric:         Mood and Affect: Mood normal.         Behavior: Behavior normal.         Thought Content: Thought content normal.         Judgment: Judgment normal.   Vitals and nursing note reviewed.             Infant:  Behaviors: Alert  Color: Healthy  Birth weight: 3.32 kg  Current weight: 3.62 kg    Problems with infant: Restricted tongue movement      General Appearance:  Alert, active, no distress                            Head:  Normocephalic, AFOF, sutures opposed                            Eyes:   Conjunctiva clear, no drainage                            Ears:   Normally placed, no anomolies                           Nose:   Septum intact, no drainage  or erythema                          Mouth:  No lesions; tongue extends just over the lower lip, lateralizes slightly with some rolling of the contralateral edge with lateralization, and the tip curls toward the palate; all movement is associated with dimpling of the tip; there is moderate cupping of the examiner's finger with no maintained seal that loosens further with traction placed on the mandible; tongue fasciculations noted after only a few sucks especially when there is traction on the mandible; the frenulum is moderately broad with little elasticity and inserts mid tongue and along the lower 1/3 of the mandible and passive lift of the tongue narrows the oral gape                   Neck:  Supple, symmetrical, trachea midline, no adenopathy; thyroid: no enlargement, symmetric, no tenderness/mass/nodules                Respiratory:  No grunting, flaring, retractions, breath sounds clear and equal           Cardiovascular:  Regular rate and rhythm. No murmur. Adequate perfusion/capillary refill. Femoral pulse present                  Abdomen:    Soft, non-tender, no masses, bowel sounds present, no HSM            Genitourinary:  Normal female genitalia, anus patent                         Spine:   No abnormalities noted       Musculoskeletal:   Full range of motion         Skin/Hair/Nails:   Skin warm, dry, and intact, no rashes or abnormal dyspigmentation or lesions               Neurologic:   No abnormal movement, tone appropriate for gestational age     Latch:  Efficiency:               Lips Flanged: Yes, on the bottle after frenotomy              Depth of latch: Very good, on bottle after frenotomy              Audible Swallow: Yes, sustained SSB after frenotomy               Visible Milk: Yes, after frenotomy on the bottle              Wide Open/ Asymmetrical: Yes, on the bottle after frenotomy              Suck Swallow Cycle: Breathing: Unlabored, Coordinated: Yes  Nipple Assessment after latch:  n/a; baby only fed by bottle  Latch Problems: After the frenotomy, Ricco takes a few minutes to settle before latching to the bottle where she attains a sustained SSB and takes the formula provided by mom and then another 2 oz of Similac 360 Sensitive at which she time she is content and all bleeding has stopped.     Position:  Infant's Ergonomics/Body               Body Alignment: Yes, for paced bottle feeding               Head Supported: Yes, for paced bottle feeding               Close to Mom's body/ Lifted/ Supported: Yes, for paced bottle feeding               Mom's Ergonomics/Body:Yes, for paced bottle feeding                           Supported: Yes, for paced bottle feeding                           Sitting Back: Yes, for paced bottle feeding                           Brings Baby to her breast: No, baby only fed by paced bottle feeding  Positioning Problems: None for paced bottle feeding        Education:  Reviewed Latch: Reviewed how to gently compress the breast as if offering a sandwich to facilitate a deeper latch.    Reviewed Positioning for Dyad: Reviewed how to bring baby to the breast so that her lower lip and chin touch the breast with her nose just above the nipple to encourage a wider, more asymmetric latch.   Reviewed Frequency/Supply & Demand: Recommended feeding on demand: when the baby gives hunger cues, when the breasts feel full, every 3 hours during the day and every 5 hours at night counting from the beginning of one feeding to the beginning of the next; whichever comes first.    Reviewed Alternative/Artificial Feedings: Paced bottle feeding  Reviewed Mom/Breast care: Reviewed expressing breast milk whenever Ricco is fed by bottle only; as often as is Maegan feels she has the time, patience, and energy; reviewed the use of some prolactin stimulating herbs and dietary substances to help build milk productions      Administrative Statements   I have spent a total time of 60 minutes in caring  for this patient on the day of the visit/encounter including Prognosis, Risks and benefits of tx options, Instructions for management, Patient and family education, Importance of tx compliance, Risk factor reductions, Impressions, Counseling / Coordination of care, and Documenting in the medical record.

## 2025-06-26 NOTE — PATIENT INSTRUCTIONS
Continue to feed on demand, offering the breast as often as you feel comfortable.    Express breast milk whenever Ricco is fed only by bottle and as needed, for comfort.    Additional milk expression immediately after breastfeeding during the day, whenever you feel you have the time, patience, and energy can help build demand and, therefore, production but is not necessary.    May try Torbangun, marketed as Boob Food Too from [employer]. This can be purchases from [employer] or [employer]. The dosing is on the bottle. Use the Boob Food Too, not Boob Food, for better results and fewer side effects.     May also try Moringa (also known as Malunggay or Golacta) as long as there is no personal or family history of clotting problems (risk of clotting not bleeding). This is available from [employer], [facility], [facility] or on line from [employer]. The typical dose is 500 to 1000 mg up to 3 x/daily.  I do recommend the pill form as the dried leaves are bitter, and it is easier to dose this way.

## 2025-06-27 ENCOUNTER — POSTPARTUM VISIT (OUTPATIENT)
Dept: OBGYN CLINIC | Facility: CLINIC | Age: 35
End: 2025-06-27
Payer: COMMERCIAL

## 2025-06-27 VITALS
DIASTOLIC BLOOD PRESSURE: 56 MMHG | SYSTOLIC BLOOD PRESSURE: 108 MMHG | BODY MASS INDEX: 28.49 KG/M2 | HEIGHT: 68 IN | WEIGHT: 188 LBS

## 2025-06-27 NOTE — PROGRESS NOTES
"Routine Post Partum Visit  [facility] - [address], [address], [place] [address]    Assessment/Plan:  Thea is a 34 y.o. year old  who presents for postpartum visit.    Routine Postpartum Care  Normal postpartum exam  Contraception: condoms  Depression Screen: PPD screen  negative  Feeding:  She is breast feeding with some supplementation.  Physical therapy referral: no  Cervical cancer screening Up to Date  Follow up in: 3 months for wellness visit, or as needed.    Additional Problems:  Assessment & Plan  Postpartum exam           Next visit: 3 mos WA    Subjective:     CC: Postpartum visit    Thea Fay is a 34 y.o. y.o. female  who presents for a postpartum visit.     She is 3 weeks postpartum following a spontaneous vaginal delivery on 25 at 39 weeks.    Outcome: spontaneous vaginal delivery, 1st degree  Anesthesia: epidural. Postpartum course has been uncomplicated. Baby's course has been uncomplicated. Baby is feeding by She is breast feeding with some supplementation..     Bleeding no bleeding. Bowel function is normal. Bladder function is normal. Patient is not sexually active since delivery. Contraception method is condoms. Postpartum depression screening: negative.    The following portions of the patient's history were reviewed and updated as appropriate: allergies, current medications, past family history, past medical history, obstetric history, gynecologic history, past social history, past surgical history and problem list.      Objective:  /56 (BP Location: Left arm, Patient Position: Sitting, Cuff Size: Standard)   Ht 5' 8\" (1.727 m)   Wt 85.3 kg (188 lb)   LMP 2024   Breastfeeding Yes   BMI 28.59 kg/m²   Pregravid Weight/BMI: 59.9 kg (132 lb) (BMI 20.08)  Current Weight: 85.3 kg (188 lb)   Total Weight Gain: 29 kg (64 lb)     General: Well appearing, no distress.  Mood and affect: Appropriate.  Abdomen: Soft, " non-tender  Vulva: normal  Vagina: normal  Cervix: closed, no bleeding  Uterus: non-tender  Adnexa: no masses, no tenderness

## 2025-06-30 NOTE — PROGRESS NOTES
I have reviewed the notes, assessments, and/or procedures performed by Cherise Song RN, IBCLC, I concur with her/his documentation of Thea Nuno MD 06/29/25